# Patient Record
Sex: FEMALE | Race: BLACK OR AFRICAN AMERICAN | Employment: OTHER | URBAN - METROPOLITAN AREA
[De-identification: names, ages, dates, MRNs, and addresses within clinical notes are randomized per-mention and may not be internally consistent; named-entity substitution may affect disease eponyms.]

---

## 2017-04-18 ENCOUNTER — HOSPITAL ENCOUNTER (OUTPATIENT)
Dept: ENDOSCOPY | Age: 72
Discharge: HOME | End: 2017-04-18
Attending: INTERNAL MEDICINE | Admitting: INTERNAL MEDICINE

## 2017-04-18 VITALS
SYSTOLIC BLOOD PRESSURE: 148 MMHG | RESPIRATION RATE: 16 BRPM | BODY MASS INDEX: 40.46 KG/M2 | TEMPERATURE: 97.4 F | OXYGEN SATURATION: 97 % | WEIGHT: 289 LBS | HEART RATE: 67 BPM | DIASTOLIC BLOOD PRESSURE: 78 MMHG | HEIGHT: 71 IN

## 2017-04-18 DIAGNOSIS — R52 PAIN: ICD-10-CM

## 2017-04-18 LAB
GLUCOSE BLD-MCNC: 85 MG/DL (ref 70–99)
GLUCOSE BLD-MCNC: 97 MG/DL (ref 70–99)
PERFORMED ON: NORMAL
PERFORMED ON: NORMAL

## 2017-04-18 RX ORDER — SODIUM CHLORIDE 0.9 % (FLUSH) 0.9 %
10 SYRINGE (ML) INJECTION PRN
Status: DISCONTINUED | OUTPATIENT
Start: 2017-04-18 | End: 2017-04-19 | Stop reason: HOSPADM

## 2017-04-18 RX ORDER — SODIUM CHLORIDE 0.9 % (FLUSH) 0.9 %
10 SYRINGE (ML) INJECTION EVERY 12 HOURS SCHEDULED
Status: DISCONTINUED | OUTPATIENT
Start: 2017-04-18 | End: 2017-04-19 | Stop reason: HOSPADM

## 2017-04-18 RX ORDER — LIDOCAINE HYDROCHLORIDE 10 MG/ML
1 INJECTION, SOLUTION EPIDURAL; INFILTRATION; INTRACAUDAL; PERINEURAL
Status: ACTIVE | OUTPATIENT
Start: 2017-04-18 | End: 2017-04-18

## 2017-04-18 RX ORDER — SODIUM CHLORIDE 9 MG/ML
INJECTION, SOLUTION INTRAVENOUS CONTINUOUS
Status: DISCONTINUED | OUTPATIENT
Start: 2017-04-18 | End: 2017-04-19 | Stop reason: HOSPADM

## 2017-04-18 ASSESSMENT — PAIN - FUNCTIONAL ASSESSMENT: PAIN_FUNCTIONAL_ASSESSMENT: 0-10

## 2017-06-05 RX ORDER — SODIUM CHLORIDE 0.9 % (FLUSH) 0.9 %
10 SYRINGE (ML) INJECTION EVERY 12 HOURS SCHEDULED
Status: CANCELLED | OUTPATIENT
Start: 2017-06-05

## 2017-06-05 RX ORDER — SODIUM CHLORIDE 9 MG/ML
INJECTION, SOLUTION INTRAVENOUS CONTINUOUS
Status: CANCELLED | OUTPATIENT
Start: 2017-06-05

## 2017-06-05 RX ORDER — SODIUM CHLORIDE 0.9 % (FLUSH) 0.9 %
10 SYRINGE (ML) INJECTION PRN
Status: CANCELLED | OUTPATIENT
Start: 2017-06-05

## 2017-06-06 ENCOUNTER — HOSPITAL ENCOUNTER (OUTPATIENT)
Dept: ENDOSCOPY | Age: 72
Discharge: HOME | End: 2017-06-06
Attending: INTERNAL MEDICINE | Admitting: INTERNAL MEDICINE

## 2017-07-18 PROBLEM — Z85.3 HISTORY OF RIGHT BREAST CANCER: Status: ACTIVE | Noted: 2017-07-18

## 2017-07-18 PROBLEM — C54.1 ENDOMETRIAL ADENOCARCINOMA (HCC): Status: ACTIVE | Noted: 2017-04-14

## 2017-07-18 PROBLEM — Z92.21 HISTORY OF CHEMOTHERAPY: Status: ACTIVE | Noted: 2017-07-18

## 2017-07-18 PROBLEM — Z92.3 HISTORY OF RADIATION THERAPY: Status: ACTIVE | Noted: 2017-07-18

## 2017-07-19 ENCOUNTER — HOSPITAL ENCOUNTER (OUTPATIENT)
Dept: OTHER | Age: 72
Discharge: HOME | End: 2017-07-19
Attending: INTERNAL MEDICINE | Admitting: INTERNAL MEDICINE

## 2017-07-19 DIAGNOSIS — R10.9 ABDOMINAL PAIN, UNSPECIFIED SITE: ICD-10-CM

## 2017-10-26 ENCOUNTER — HOSPITAL ENCOUNTER (OUTPATIENT)
Dept: ENDOSCOPY | Age: 72
Discharge: HOME | End: 2017-10-26
Attending: INTERNAL MEDICINE | Admitting: INTERNAL MEDICINE

## 2017-10-26 LAB
GLUCOSE BLD-MCNC: 111 MG/DL (ref 70–99)
GLUCOSE BLD-MCNC: 84 MG/DL (ref 70–99)
PERFORMED ON: ABNORMAL
PERFORMED ON: NORMAL

## 2017-10-26 NOTE — ANESTHESIA PRE-OP
27 MG TABS Take by mouth      Omega-3 Fatty Acids (FISH OIL PO) Take 1 tablet by mouth daily      CycloSPORINE (RESTASIS MULTIDOSE OP) Apply 1 drop to eye 2 times daily       lipase-protease-amylase (CREON) 96641 UNITS delayed release capsule Take 2 capsules by mouth 3 times daily (with meals) 180 capsule 11    MULTIPLE VITAMIN PO Take 1 capsule by mouth daily       Ascorbic Acid (VITAMIN C) 500 MG tablet Take 500 mg by mouth 2 times daily       Loratadine 10 MG CAPS Take 10 mg by mouth daily       torsemide (DEMADEX) 20 MG tablet Take 20 mg by mouth daily       glimepiride (AMARYL) 2 MG tablet Take 2 mg by mouth daily.  minoxidil (LONITEN) 2.5 MG tablet Take 10 mg by mouth 2 times daily.  atorvastatin (LIPITOR) 20 MG tablet Take 20 mg by mouth daily.  metoprolol (LOPRESSOR) 50 MG tablet Take 50 mg by mouth 2 times daily.  CloNIDine HCl (CATAPRES-TTS-3 TD) Place  onto the skin once a week. THURSDAY      valsartan (DIOVAN) 160 MG tablet Take 160 mg by mouth 2 times daily.  verapamil (VERELAN PM) 240 MG CR capsule Take 240 mg by mouth nightly. No current facility-administered medications for this encounter. Allergies:     Allergies   Allergen Reactions    Furosemide Other (See Comments)     Pancreatitis    Tetracyclines & Related Other (See Comments)     'gives me a pancreatitis\"    Thiazide-Type Diuretics Other (See Comments)     Causes pancreatitis    Propofol Other (See Comments)     Causes pancreatitis    Contrast [Iodides] Hives    Toradol [Ketorolac Tromethamine] Other (See Comments)     Causes pancreatitis    Percocet [Oxycodone-Acetaminophen] Nausea Only       Problem List:    Patient Active Problem List   Diagnosis Code    Diabetes mellitus ([facility] [address] E11.9    Essential hypertension I10    Dyslipidemia E78.5    Obesity E66.9    Anemia D64.9    Breast cancer, right breast ([facility] [address] C50.911    Acute on chronic pancreatitis (HCC) K85.90, K86.1    Chronic pancreatitis ([facility] [address] K86.1    Hypokalemia E87.6    Endometrial adenocarcinoma (HCC) C54.1    Sleep apnea G47.30    Pancreatitis K85.90    Pancreatic abnormality Q45.3    Hypertension I10    Hyperlipidemia E78.5    History of right breast cancer Z85.3    History of chemotherapy Z92.21    History of radiation therapy Z92.3    Patient is Uatsdin Z78.9       Past Medical History:        Diagnosis Date    Adenomatous colon polyp 10/31/2014    Diabetes mellitus ([facility] [address]     Endometrial adenocarcinoma ([facility] [address] 04/14/2017    on D&C with Dr. Jose Gonzalez History of breast cancer in female 06/2003    Right w recurrence 2/2014    Hyperlipidemia     Hypertension     Obesity     Pancreatic abnormality     pancreatic stent placement - 2017    Pancreatitis     Sleep apnea     DOESN'T USE CPAP       Past Surgical History:        Procedure Laterality Date    APPENDECTOMY      BACK SURGERY      LIPOMA REMOVED    BREAST LUMPECTOMY Right 06/12/2003    dr. Preston Davis  10/31/2014    FU 3 yr    COSMETIC SURGERY      breast    DILATION AND CURETTAGE OF UTERUS  04/14/2017    ENDOSCOPIC ULTRASOUND (UPPER)  2/4/15    ERCP  6/5/2015    Sphincerotomy, Sphincter of Oddi Manometry, Pancreatic stent placement    ERCP  7/21/15    PANCREATIC STENT EXCHANGE    ERCP  09/01/15    stent removal    ERCP N/A 10/20/2015    Endoscopic retrograde pancreatography with pancreatic stent exchange    ERCP N/A 04/18/2017    ocedure: Endoscopic retrograde cholangiopancreatography with biopsy and pancreatic stent exchange    ERCP N/A 05/17/2017    EYE SURGERY      KNEE ARTHROSCOPY      MASTECTOMY, BILATERAL Bilateral 02/04/2014    Dr. Freitas Monday 06/16/2017    [facility]  12[facility]14[facility]    UPPER GASTROINTESTINAL ENDOSCOPY  2/4/15       Social History:    Social MD          CHIEF COMPLAINT:       Chief Complaint  Patient presents with   New Patient          HISTORY OF PRESENT ILLNESS:     HPI: This is a 73 y.o. female that presents in consultation from Dr. Morris Porras for a new diagnosis of endometrial cancer. She presented earlier this year with vaginal bleeding. Ultrasound showed a 2.13 cm endometrium and a biopsy (2/16/17) showed complex hyperplasia and atypia. A D&C was performed for more thorough assessment, revealing endometrial adenocarcinoma, grade 1. On 6/16/17, she underwent a robotic-assisted laparoscopic hysterectomy and bilateral salpingo-oophorectomy. Pathology was consistent with a stage 1a endometrioid carcinoma, grade 1.      She presents today for follow up.      She has a medical history notable for recurrent breast cancer (invasive ductal carcinoma). She has a family history of liver cancer in her father (attributed to alcoholism) and lung cancer in her mother (attributed to smoking). She has never undergone genetic testing.           Oncology History    1. Invasive ductal carcinoma at the right breast stage [id number], ER positive,  CT negative, HER-2/amalia FISH positive diagnosed 07/03 status post lumpectomy, sentinel  and axillary node dissection followed by breast radiation, four cycles of AC, Arimidex for  five years completed 11/08.     2. Recurrent 2/2014 with bilateral mastectomy     3.  Stage 1a, Grade 1 endometrioid adenocarcinoma, negative LVSI     Breast cancer, right breast ([facility] [address]    5/27/2003 Initial Diagnosis      Breast cancer, right breast [facility])        - 8/6/2003 Radiation              6/12/2003 Surgery      Lumpectomy and LND with Dr. Farrukh short       11/5/2013 Relapse      Abnormal mammogram       2/4/2014 Surgery      Bilateral simple mastectomy and immediate reconstruction using tissue expanders.        Endometrial adenocarcinoma ([facility] [address]    4/14/2017 Initial Diagnosis      Endometrial adenocarcinoma [facility])       6/16/2017 Surgery      Robotic (See Comments) 04/26/2016   Tetracyclines & related Other (See Comments) 10/21/2012   Thiazide-type diuretics Other (See Comments) 10/21/2012   Propofol Other (See Comments) 10/21/2012   Contrast (iodides) Hives 01/01/2014   Toradol (ketorolac tromethamine) Other (See Comments) 06/05/2015   Percocet (oxycodone-acetaminophen) Nausea Only 04/11/2010          MEDICATIONS:      Current Medication     Current Outpatient Prescriptions:     ferrous sulfate 27 MG TABS, Take by mouth, Disp: , Rfl:     Omega-3 Fatty Acids (FISH OIL PO), Take 1 tablet by mouth daily, Disp: , Rfl:     CycloSPORINE (RESTASIS MULTIDOSE OP), Apply 1 drop to eye 2 times daily , Disp: , Rfl:     lipase-protease-amylase (CREON) 03481 UNITS delayed release capsule, Take 2 capsules by mouth 3 times daily (with meals), Disp: 180 capsule, Rfl: 11    MULTIPLE VITAMIN PO, Take 1 capsule by mouth daily , Disp: , Rfl:     Ascorbic Acid (VITAMIN C) 500 MG tablet, Take 500 mg by mouth 2 times daily , Disp: , Rfl:     Loratadine 10 MG CAPS, Take 10 mg by mouth daily , Disp: , Rfl:     torsemide (DEMADEX) 20 MG tablet, Take 20 mg by mouth daily , Disp: , Rfl:     glimepiride (AMARYL) 2 MG tablet, Take 2 mg by mouth daily. , Disp: , Rfl:     minoxidil (LONITEN) 2.5 MG tablet, Take 10 mg by mouth 2 times daily. , Disp: , Rfl:     atorvastatin (LIPITOR) 20 MG tablet, Take 20 mg by mouth daily. , Disp: , Rfl:     metoprolol (LOPRESSOR) 50 MG tablet, Take 50 mg by mouth 2 times daily. , Disp: , Rfl:     CloNIDine HCl (CATAPRES-TTS-3 TD), Place  onto the skin once a week. THURSDAY, Disp: , Rfl:     valsartan (DIOVAN) 160 MG tablet, Take 160 mg by mouth 2 times daily. , Disp: , Rfl:     verapamil (VERELAN PM) 240 MG CR capsule, Take 240 mg by mouth nightly., Disp: , Rfl:          PHYSICAL EXAM:       /64  Pulse 68  Temp 98.6 °F (37 °C) (Oral)   Resp 14  Ht 5' 10.87\" (1.8 m)  Wt 269 lb 3.2 oz (122.1 kg)  LMP 02/04/2003  BMI 37.69 kg/m2  Physical Exam   Constitutional: She appears well-developed and well-nourished. No distress. HENT:   Head: Normocephalic. Neck: Normal range of motion. No thyromegaly present. Cardiovascular: Normal rate and regular rhythm. Exam reveals no gallop and no friction rub. No murmur heard. Pulmonary/Chest: Effort normal and breath sounds normal. No respiratory distress. She has no wheezes. She has no rales. Abdominal: Soft. She exhibits no distension. There is no tenderness. Genitourinary:   Genitourinary Comments: Deferred (patient had pelvic exam in Dr. Ace Messina office yesterday)   Musculoskeletal: Normal range of motion. Lymphadenopathy:     She has no cervical adenopathy. Neurological: She is alert. Skin: Skin is warm and dry. No rash noted. She is not diaphoretic. No erythema. Psychiatric: She has a normal mood and affect. Her behavior is normal.           LABS:     Pathology (2/16/17): EMB  Endometrium, curettage:  - Complex endometrial hyperplasia without atypia.     Pathology (4/14/17): D&C  Endometrium, curettage:  - Foci of endometrioid adenocarcinoma, FIGO grade 1, arising in a background of extensive endometrial hyperplasia with atypia      Pathology (6/16/17): TLH/BSO  Uterus, cervix, right and left ovaries, and right and left fallopian tubes; robotic hysterectomy and bilateral salpingo-oophorectomy:  - Invasive endometrioid adenocarcinoma, FIGO grade 1, involving less than 50% of the myometrium and arising in a background of complex endometrial hyperplasia with atypia (See Synoptic Report). - Margins of resection are negative for involvement.   - Cervix, right and left ovaries, and right and left fallopian tubes negative for malignancy.       WBC  Date Value Ref Range Status  06/07/2017 8.8 4.0 - 11.0 K/uL Final       Hemoglobin  Date Value Ref Range Status  06/07/2017 8.9 (L) 12.0 - 16.0 g/dL Final       Hematocrit  Date Value Ref Range Status  06/07/2017 27.6 (L) 36.0 - 48.0 NILA Taylor MD  [facility]  [phone] ([id number])          Office Visit on 7/19/2017          Revision History          Detailed Report         Note shared with patient   Progress Notes Info     Author Note Status Last Update User  MD Claudia Signed MD Claudia  Last Update Date/Time: 7/22/2017 11:04 PM  Chart Review Routing History     Routing history could not be found for this note. This is because the note has never been routed or because communication record creation was suppressed.    )        Anesthetic plan and risks discussed with patient. Plan discussed with CRNA.                 Ian Ahumada, MD   10/26/2017

## 2017-10-27 NOTE — ANESTHESIA POST-OP
Anesthesia Post-op Note    Patient: Alma Jurado  MRN: [id number]  YOB: 1945  Date of evaluation: 10/27/2017  Time:  9:35 AM     Procedure(s) Performed:     Last Vitals: LMP 02/04/2003     Fernando Phase I:      Fernando Phase II:      Anesthesia Post Evaluation    Final anesthesia type: TIVA and MAC  Patient location during evaluation: PACU  Level of consciousness: awake and alert  Complications: no  Respiratory status: acceptable  Hydration status: euvolemic        JUAN PENA MD  9:35 AM

## 2018-01-16 PROBLEM — Z85.42 HISTORY OF ENDOMETRIAL CANCER: Status: ACTIVE | Noted: 2018-01-16

## 2019-06-25 ENCOUNTER — HOSPITAL ENCOUNTER (OUTPATIENT)
Dept: ULTRASOUND IMAGING | Age: 74
Discharge: HOME | End: 2019-06-25
Payer: MEDICARE

## 2019-06-25 DIAGNOSIS — N18.9 CHRONIC KIDNEY DISEASE, UNSPECIFIED CKD STAGE: ICD-10-CM

## 2019-06-25 PROCEDURE — 76770 US EXAM ABDO BACK WALL COMP: CPT

## 2019-10-07 PROBLEM — K62.1 RECTAL POLYP: Status: ACTIVE | Noted: 2017-11-16

## 2019-10-07 PROBLEM — Z86.010 HISTORY OF COLONIC POLYPS: Status: ACTIVE | Noted: 2017-11-16

## 2019-10-07 PROBLEM — E03.8 SECONDARY HYPOTHYROIDISM: Status: ACTIVE | Noted: 2018-09-11

## 2019-10-07 PROBLEM — N89.8 VAGINAL DISCHARGE: Status: ACTIVE | Noted: 2019-10-07

## 2019-10-07 PROBLEM — N28.9 ACUTE RENAL IMPAIRMENT: Status: ACTIVE | Noted: 2019-03-12

## 2019-10-07 PROBLEM — C50.919 PRIMARY MALIGNANT NEOPLASM OF FEMALE BREAST (HCC): Status: ACTIVE | Noted: 2019-10-07

## 2019-10-07 PROBLEM — Z86.0100 HISTORY OF COLONIC POLYPS: Status: ACTIVE | Noted: 2017-11-16

## 2019-11-22 ENCOUNTER — HOSPITAL ENCOUNTER (OUTPATIENT)
Dept: ULTRASOUND IMAGING | Age: 74
Discharge: HOME | End: 2019-11-22
Payer: MEDICARE

## 2019-11-22 ENCOUNTER — HOSPITAL ENCOUNTER (OUTPATIENT)
Age: 74
Discharge: HOME | End: 2019-11-22
Payer: MEDICARE

## 2019-11-22 DIAGNOSIS — N17.9 AKI (ACUTE KIDNEY INJURY) (HCC): ICD-10-CM

## 2019-11-22 DIAGNOSIS — I10 ESSENTIAL HYPERTENSION: Chronic | ICD-10-CM

## 2019-11-22 PROCEDURE — 76770 US EXAM ABDO BACK WALL COMP: CPT

## 2019-11-25 ENCOUNTER — HOSPITAL ENCOUNTER (OUTPATIENT)
Age: 74
Discharge: HOME | End: 2019-11-25
Payer: MEDICARE

## 2019-11-25 DIAGNOSIS — I10 ESSENTIAL HYPERTENSION: Chronic | ICD-10-CM

## 2019-11-25 DIAGNOSIS — N17.9 AKI (ACUTE KIDNEY INJURY) (HCC): ICD-10-CM

## 2019-11-25 LAB
24HR URINE VOLUME (ML): 1700 ML
ALBUMIN SERPL-MCNC: 4.2 G/DL (ref 3.4–5)
ANION GAP SERPL CALCULATED.3IONS-SCNC: 11 MMOL/L (ref 3–16)
BILIRUBIN URINE: NEGATIVE
BLOOD, URINE: NEGATIVE
BUN BLDV-MCNC: 31 MG/DL (ref 7–20)
C3 COMPLEMENT: 143.8 MG/DL (ref 90–180)
C4 COMPLEMENT: 48.4 MG/DL (ref 10–40)
CALCIUM SERPL-MCNC: 9.5 MG/DL (ref 8.3–10.6)
CHLORIDE BLD-SCNC: 99 MMOL/L (ref 99–110)
CLARITY: ABNORMAL
CO2: 28 MMOL/L (ref 21–32)
COLOR: YELLOW
CREAT SERPL-MCNC: 1.6 MG/DL (ref 0.6–1.2)
CREAT SERPL-MCNC: 1.6 MG/DL (ref 0.6–1.2)
CREATININE 24 HOUR URINE: 1.5 G/24HR (ref 0.6–1.5)
CREATININE CLEARANCE: 46 ML/MIN (ref 88–128)
CREATININE URINE: 172.1 MG/DL (ref 28–259)
EPITHELIAL CELLS, UA: 10 /HPF (ref 0–5)
FERRITIN: 214.6 NG/ML (ref 15–150)
GFR AFRICAN AMERICAN: 38
GFR NON-AFRICAN AMERICAN: 32
GLUCOSE BLD-MCNC: 94 MG/DL (ref 70–99)
GLUCOSE URINE: NEGATIVE MG/DL
HCT VFR BLD CALC: 30.7 % (ref 36–48)
HEMOGLOBIN: 10.1 G/DL (ref 12–16)
HYALINE CASTS: 10 /LPF (ref 0–8)
IRON SATURATION: 31 % (ref 15–50)
IRON: 66 UG/DL (ref 37–145)
KETONES, URINE: NEGATIVE MG/DL
LEUKOCYTE ESTERASE, URINE: NEGATIVE
Lab: 24 HR
MCH RBC QN AUTO: 27.8 PG (ref 26–34)
MCHC RBC AUTO-ENTMCNC: 32.9 G/DL (ref 31–36)
MCV RBC AUTO: 84.7 FL (ref 80–100)
MICROSCOPIC EXAMINATION: YES
NITRITE, URINE: NEGATIVE
PDW BLD-RTO: 15.1 % (ref 12.4–15.4)
PH UA: 6 (ref 5–8)
PHOSPHORUS: 2.9 MG/DL (ref 2.5–4.9)
PLATELET # BLD: 368 K/UL (ref 135–450)
PMV BLD AUTO: 7.9 FL (ref 5–10.5)
POTASSIUM SERPL-SCNC: 4.3 MMOL/L (ref 3.5–5.1)
PROTEIN 24 HOUR URINE: 0.09 G/24HR
PROTEIN PROTEIN: 10 MG/DL
PROTEIN UA: NEGATIVE MG/DL
RBC # BLD: 3.62 M/UL (ref 4–5.2)
RBC UA: ABNORMAL /HPF (ref 0–2)
SODIUM BLD-SCNC: 138 MMOL/L (ref 136–145)
SPECIFIC GRAVITY UA: 1.01 (ref 1–1.03)
TOTAL IRON BINDING CAPACITY: 210 UG/DL (ref 260–445)
URINE TYPE: ABNORMAL
UROBILINOGEN, URINE: 0.2 E.U./DL
WBC # BLD: 6.8 K/UL (ref 4–11)
WBC UA: 2 /HPF (ref 0–5)
YEAST: PRESENT /HPF

## 2019-11-25 PROCEDURE — 83550 IRON BINDING TEST: CPT

## 2019-11-25 PROCEDURE — 85027 COMPLETE CBC AUTOMATED: CPT

## 2019-11-25 PROCEDURE — 80069 RENAL FUNCTION PANEL: CPT

## 2019-11-25 PROCEDURE — 84156 ASSAY OF PROTEIN URINE: CPT

## 2019-11-25 PROCEDURE — 82575 CREATININE CLEARANCE TEST: CPT

## 2019-11-25 PROCEDURE — 84244 ASSAY OF RENIN: CPT

## 2019-11-25 PROCEDURE — 84166 PROTEIN E-PHORESIS/URINE/CSF: CPT

## 2019-11-25 PROCEDURE — 84155 ASSAY OF PROTEIN SERUM: CPT

## 2019-11-25 PROCEDURE — 86160 COMPLEMENT ANTIGEN: CPT

## 2019-11-25 PROCEDURE — 82088 ASSAY OF ALDOSTERONE: CPT

## 2019-11-25 PROCEDURE — 82728 ASSAY OF FERRITIN: CPT

## 2019-11-25 PROCEDURE — 84165 PROTEIN E-PHORESIS SERUM: CPT

## 2019-11-25 PROCEDURE — 86038 ANTINUCLEAR ANTIBODIES: CPT

## 2019-11-25 PROCEDURE — 82570 ASSAY OF URINE CREATININE: CPT

## 2019-11-25 PROCEDURE — 83835 ASSAY OF METANEPHRINES: CPT

## 2019-11-25 PROCEDURE — 83540 ASSAY OF IRON: CPT

## 2019-11-25 PROCEDURE — 36415 COLL VENOUS BLD VENIPUNCTURE: CPT

## 2019-11-25 PROCEDURE — 84585 ASSAY OF URINE VMA: CPT

## 2019-11-25 PROCEDURE — 81001 URINALYSIS AUTO W/SCOPE: CPT

## 2019-11-26 LAB — ANTI-NUCLEAR ANTIBODY (ANA): NEGATIVE

## 2019-11-27 LAB
ALBUMIN SERPL-MCNC: 3.4 G/DL (ref 3.1–4.9)
ALDOSTERONE: 13 NG/DL
ALPHA-1-GLOBULIN: 0.2 G/DL (ref 0.2–0.4)
ALPHA-2-GLOBULIN: 1.1 G/DL (ref 0.4–1.1)
BETA GLOBULIN: 1 G/DL (ref 0.9–1.6)
GAMMA GLOBULIN: 1.6 G/DL (ref 0.6–1.8)
SPE/IFE INTERPRETATION: NORMAL
TOTAL PROTEIN: 7.3 G/DL (ref 6.4–8.2)
URINE ELECTROPHORESIS INTERP: NORMAL

## 2019-11-29 LAB
CREATININE 24 HOUR URINE: 1326 MG/D (ref 500–1400)
CREATININE URINE: 78 MG/DL
HOURS COLLECTED: 24 HR
METANEPH/PLASMA INTERP: NORMAL
METANEPHRINE FREE PLASMA: 0.29 NMOL/L (ref 0–0.49)
METANEPHRINE INTREP URINE: NORMAL
METANEPHRINE UG/G CRE: 74 UG/G CRT (ref 0–300)
METANEPHRINE, UR-PER VOL: 58 UG/L
METANEPHRINES URINE: 99 UG/D (ref 36–229)
NORMETANEPHRINE 24 HOUR URINE: 408 UG/D (ref 95–650)
NORMETANEPHRINE FREE PLASMA: 0.74 NMOL/L (ref 0–0.89)
NORMETANEPHRINE, (G/CRT): 308 UG/G CRT (ref 0–400)
NORMETANEPHRINES, NMOL/L: 240 UG/L
URINE TOTAL VOLUME: 1700 ML
VMA INTERPRETATION: NORMAL
VMA URINE MG/ML: 2.6 MG/L
VMA, UR/G CRT: 3 MG/GCR (ref 0–6)
VMA-MG/D: 4.4 MG/D (ref 0–7)

## 2019-11-30 LAB — RENIN ACTIVITY: 21.1 NG/ML/HR

## 2020-04-24 ENCOUNTER — HOSPITAL ENCOUNTER (OUTPATIENT)
Age: 75
Discharge: HOME | End: 2020-04-24
Payer: MEDICARE

## 2020-04-24 LAB
ALBUMIN SERPL-MCNC: 3.9 G/DL (ref 3.4–5)
ANION GAP SERPL CALCULATED.3IONS-SCNC: 12 MMOL/L (ref 3–16)
BUN BLDV-MCNC: 18 MG/DL (ref 7–20)
CALCIUM SERPL-MCNC: 9.4 MG/DL (ref 8.3–10.6)
CHLORIDE BLD-SCNC: 101 MMOL/L (ref 99–110)
CO2: 28 MMOL/L (ref 21–32)
CREAT SERPL-MCNC: 1.1 MG/DL (ref 0.6–1.2)
CREATININE URINE: 79.4 MG/DL (ref 28–259)
GFR AFRICAN AMERICAN: 59
GFR NON-AFRICAN AMERICAN: 49
GLUCOSE BLD-MCNC: 98 MG/DL (ref 70–99)
HCT VFR BLD CALC: 33.4 % (ref 36–48)
HEMOGLOBIN: 10.8 G/DL (ref 12–16)
MCH RBC QN AUTO: 27.4 PG (ref 26–34)
MCHC RBC AUTO-ENTMCNC: 32.3 G/DL (ref 31–36)
MCV RBC AUTO: 84.8 FL (ref 80–100)
PDW BLD-RTO: 14.6 % (ref 12.4–15.4)
PHOSPHORUS: 3.1 MG/DL (ref 2.5–4.9)
PLATELET # BLD: 352 K/UL (ref 135–450)
PMV BLD AUTO: 7.7 FL (ref 5–10.5)
POTASSIUM SERPL-SCNC: 4.4 MMOL/L (ref 3.5–5.1)
PROTEIN PROTEIN: <4 MG/DL
RBC # BLD: 3.94 M/UL (ref 4–5.2)
SODIUM BLD-SCNC: 141 MMOL/L (ref 136–145)
WBC # BLD: 7.7 K/UL (ref 4–11)

## 2020-04-24 PROCEDURE — 85027 COMPLETE CBC AUTOMATED: CPT

## 2020-04-24 PROCEDURE — 82570 ASSAY OF URINE CREATININE: CPT

## 2020-04-24 PROCEDURE — 84156 ASSAY OF PROTEIN URINE: CPT

## 2020-04-24 PROCEDURE — 80069 RENAL FUNCTION PANEL: CPT

## 2020-09-07 ENCOUNTER — HOSPITAL ENCOUNTER (OUTPATIENT)
Age: 75
Discharge: HOME | End: 2020-09-07
Payer: MEDICARE

## 2020-09-07 LAB
ALBUMIN SERPL-MCNC: 3.9 G/DL (ref 3.4–5)
ANION GAP SERPL CALCULATED.3IONS-SCNC: 11 MMOL/L (ref 3–16)
BUN BLDV-MCNC: 32 MG/DL (ref 7–20)
CALCIUM SERPL-MCNC: 9.4 MG/DL (ref 8.3–10.6)
CHLORIDE BLD-SCNC: 104 MMOL/L (ref 99–110)
CO2: 22 MMOL/L (ref 21–32)
CREAT SERPL-MCNC: 1.3 MG/DL (ref 0.6–1.2)
GFR AFRICAN AMERICAN: 49
GFR NON-AFRICAN AMERICAN: 40
GLUCOSE BLD-MCNC: 90 MG/DL (ref 70–99)
HCT VFR BLD CALC: 35.8 % (ref 36–48)
HEMOGLOBIN: 11 G/DL (ref 12–16)
MCH RBC QN AUTO: 27.9 PG (ref 26–34)
MCHC RBC AUTO-ENTMCNC: 30.7 G/DL (ref 31–36)
MCV RBC AUTO: 90.6 FL (ref 80–100)
PDW BLD-RTO: 15.9 % (ref 12.4–15.4)
PHOSPHORUS: 3.4 MG/DL (ref 2.5–4.9)
PLATELET # BLD: 266 K/UL (ref 135–450)
PMV BLD AUTO: 8.2 FL (ref 5–10.5)
POTASSIUM SERPL-SCNC: 4.6 MMOL/L (ref 3.5–5.1)
RBC # BLD: 3.95 M/UL (ref 4–5.2)
SODIUM BLD-SCNC: 137 MMOL/L (ref 136–145)
WBC # BLD: 6.4 K/UL (ref 4–11)

## 2020-09-07 PROCEDURE — 36415 COLL VENOUS BLD VENIPUNCTURE: CPT

## 2020-09-07 PROCEDURE — 85027 COMPLETE CBC AUTOMATED: CPT

## 2020-09-07 PROCEDURE — 80069 RENAL FUNCTION PANEL: CPT

## 2020-09-07 PROCEDURE — 82570 ASSAY OF URINE CREATININE: CPT

## 2020-09-07 PROCEDURE — 84156 ASSAY OF PROTEIN URINE: CPT

## 2020-09-08 LAB
CREATININE URINE: 60.3 MG/DL (ref 28–259)
PROTEIN PROTEIN: <4 MG/DL
PROTEIN/CREAT RATIO: NORMAL MG/DL

## 2020-11-30 ENCOUNTER — OFFICE VISIT (OUTPATIENT)
Dept: PRIMARY CARE CLINIC | Age: 75
End: 2020-11-30
Payer: MEDICARE

## 2020-11-30 PROCEDURE — G8417 CALC BMI ABV UP PARAM F/U: HCPCS | Performed by: NURSE PRACTITIONER

## 2020-11-30 PROCEDURE — 99211 OFF/OP EST MAY X REQ PHY/QHP: CPT | Performed by: NURSE PRACTITIONER

## 2020-11-30 PROCEDURE — G8428 CUR MEDS NOT DOCUMENT: HCPCS | Performed by: NURSE PRACTITIONER

## 2020-11-30 NOTE — PATIENT INSTRUCTIONS

## 2020-11-30 NOTE — PROGRESS NOTES
Oriana Morrow received a viral test for COVID-19. They were educated on isolation and quarantine as appropriate. For any symptoms, they were directed to seek care from their PCP, given contact information to establish with a doctor, directed to an urgent care or the emergency room.

## 2020-12-01 LAB — SARS-COV-2: NOT DETECTED

## 2020-12-01 RX ORDER — LEVOTHYROXINE SODIUM 175 UG/1
150 TABLET ORAL DAILY
COMMUNITY

## 2020-12-01 NOTE — PROGRESS NOTES
Name_______________________________________Printed:____________________  Date and time of surgery__12/4/2020  0830______________________Arrival Time:____0700  FEC____________   1. The instructions given regarding when and if a patient needs to stop oral intake prior to surgery varies. Follow the specific instructions you were given                  ___Nothing to eat or to drink after Midnight the night before. _XXX___Endoscopy patient follow your DRS instructions-generally you will be doing a part of the prep after Midnight                   ____Carbo loading or ERAS instructions will be given to select patients-if you have been given those instructions -please do the following                           The evening before your surgery after dinner before midnight drink 40 ounces of gatorade. If you are diabetic use sugar free. The morning of surgery drink 40 ounces of water. This needs to be finished 3 hours prior to your surgery start time. 2. Take the following pills with a small sip of water on the morning of surgery_____clonidine, minoxidil, metoprolol, levothyroxine ______________________________________________               3. Aspirin, Ibuprofen, Advil, Naproxen, Vitamin E and other Anti-inflammatory products and supplements should be stopped for 5 -7days before surgery or as directed by your physician. 4. Check with your Doctor regarding stopping Plavix, Coumadin,Eliquis, Lovenox,Effient,Pradaxa,Xarelto, Fragmin or other blood thinners and follow their instructions. 5. Do not smoke, and do not drink any alcoholic beverages 24 hours prior to surgery. This includes NA Beer. Refrain from the usage of any recreational drugs. 6. You may brush your teeth and gargle the morning of surgery. DO NOT SWALLOW WATER   7. You MUST make arrangements for a responsible adult to stay on site while you are here and take you home after your surgery.  You will not be allowed to leave alone or drive yourself home. It is strongly suggested someone stay with you the first 24 hrs. Your surgery will be cancelled if you do not have a ride home. 8. A parent/legal guardian must accompany a child scheduled for surgery and plan to stay at the hospital until the child is discharged. Please do not bring other children with you. 9. Please wear simple, loose fitting clothing to the hospital.  Eve Sy not bring valuables (money, credit cards, checkbooks, etc.) Do not wear any makeup (including no eye makeup) or nail polish on your fingers or toes. 10. DO NOT wear any jewelry or piercings on day of surgery. All body piercing jewelry must be removed. 11. If you have ___dentures, they will be removed before going to the OR; we will provide you a container. If you wear ___contact lenses or ___glasses, they will be removed; please bring a case for them. 12. Please see your family doctor/pediatrician for a history & physical and/or concerning medications. Bring any test results/reports from your physician's office. PCP__________________Phone___________H&P Appt. Date________             13 If you  have a Living Will and Durable Power of  for Healthcare, please bring in a copy. 15. Notify your Surgeon if you develop any illness between now and surgery  time, cough, cold, fever, sore throat, nausea, vomiting, etc.  Please notify your surgeon if you experience dizziness, shortness of breath or blurred vision between now & the time of your surgery             15. DO NOT shave your operative site 96 hours prior to surgery. For face & neck surgery, men may use an electric razor 48 hours prior to surgery. 16. Shower the night before or morning of surgery using an antibacterial soap or as you have been instructed. 17. To provide excellent care visitors will be limited to one in the room at any given time.              18.  Please bring picture ID and insurance card. 19.  Visit our web site for additional information:  [email or web address]              20.During flu season no children under the age of 15 are permitted in the hospital for the safety of all patients. 21. If you take a long acting insulin in the evening only  take half of your usual  dose the night  before your procedure              22. If you use a c-pap please bring DOS if staying overnight,             23.For your convenience [facility] has a pharmacy on site to fill your prescriptions. 24. If you use oxygen and have a portable tank please bring it  with you the DOS             25. Bring a complete list of all your medications with name and dose include any supplements. 26. Other__________________________________________   *Please call pre admission testing if you any further questions   Madison Bolivar   [address]    [facility] [address]  [phone]   [address] [place] [address]       All above information reviewed with patient in person or by phone. Patient verbalizes understanding. All questions and concerns addressed. Patient/Rep____________________                                                                                                                                    There is a one visitor policy at [facility] for all surgeries and endoscopies. Whether the visitor can stay or will be asked to wait in the car will depend on the current policy and if social distancing can be maintained. The policy is subject to change at any time. Please make sure the visitor has a cell phone that is on,charged and able to accept calls, as this may be the way that the staff communicates with them. Pain management is NO VISITOR policyThe patients ride is expected to remain in the car with a cell phone for communication. If the ride is leaving the hospital grounds please make sure they are back in time for pickup. Have the patient inform the staff on arrival what their rides plans are while the patient is in the facility. At the MAIN there is one visitor allowed. Please note that the visitor policy is subject to change.

## 2020-12-04 ENCOUNTER — ANESTHESIA EVENT (OUTPATIENT)
Dept: ENDOSCOPY | Age: 75
End: 2020-12-04
Payer: MEDICARE

## 2020-12-04 ENCOUNTER — HOSPITAL ENCOUNTER (OUTPATIENT)
Age: 75
Setting detail: OUTPATIENT SURGERY
Discharge: HOME | End: 2020-12-04
Attending: INTERNAL MEDICINE | Admitting: INTERNAL MEDICINE
Payer: MEDICARE

## 2020-12-04 ENCOUNTER — ANESTHESIA (OUTPATIENT)
Dept: ENDOSCOPY | Age: 75
End: 2020-12-04
Payer: MEDICARE

## 2020-12-04 VITALS
DIASTOLIC BLOOD PRESSURE: 65 MMHG | SYSTOLIC BLOOD PRESSURE: 134 MMHG | RESPIRATION RATE: 10 BRPM | OXYGEN SATURATION: 100 %

## 2020-12-04 VITALS
HEART RATE: 57 BPM | BODY MASS INDEX: 39.37 KG/M2 | OXYGEN SATURATION: 98 % | TEMPERATURE: 98.3 F | RESPIRATION RATE: 16 BRPM | SYSTOLIC BLOOD PRESSURE: 131 MMHG | HEIGHT: 70 IN | DIASTOLIC BLOOD PRESSURE: 64 MMHG | WEIGHT: 275 LBS

## 2020-12-04 LAB
GLUCOSE BLD-MCNC: 120 MG/DL (ref 70–99)
GLUCOSE BLD-MCNC: 98 MG/DL (ref 70–99)
PERFORMED ON: ABNORMAL
PERFORMED ON: NORMAL

## 2020-12-04 PROCEDURE — 6360000002 HC RX W HCPCS: Performed by: NURSE ANESTHETIST, CERTIFIED REGISTERED

## 2020-12-04 PROCEDURE — 3700000001 HC ADD 15 MINUTES (ANESTHESIA): Performed by: INTERNAL MEDICINE

## 2020-12-04 PROCEDURE — 7100000011 HC PHASE II RECOVERY - ADDTL 15 MIN: Performed by: INTERNAL MEDICINE

## 2020-12-04 PROCEDURE — 88305 TISSUE EXAM BY PATHOLOGIST: CPT

## 2020-12-04 PROCEDURE — 3609010600 HC COLONOSCOPY POLYPECTOMY SNARE/COLD BIOPSY: Performed by: INTERNAL MEDICINE

## 2020-12-04 PROCEDURE — 3700000000 HC ANESTHESIA ATTENDED CARE: Performed by: INTERNAL MEDICINE

## 2020-12-04 PROCEDURE — 2709999900 HC NON-CHARGEABLE SUPPLY: Performed by: INTERNAL MEDICINE

## 2020-12-04 PROCEDURE — 7100000010 HC PHASE II RECOVERY - FIRST 15 MIN: Performed by: INTERNAL MEDICINE

## 2020-12-04 PROCEDURE — 2500000003 HC RX 250 WO HCPCS: Performed by: NURSE ANESTHETIST, CERTIFIED REGISTERED

## 2020-12-04 PROCEDURE — 2580000003 HC RX 258: Performed by: ANESTHESIOLOGY

## 2020-12-04 RX ORDER — ETOMIDATE 2 MG/ML
INJECTION INTRAVENOUS PRN
Status: DISCONTINUED | OUTPATIENT
Start: 2020-12-04 | End: 2020-12-04 | Stop reason: SDUPTHER

## 2020-12-04 RX ORDER — FENTANYL CITRATE 50 UG/ML
INJECTION, SOLUTION INTRAMUSCULAR; INTRAVENOUS PRN
Status: DISCONTINUED | OUTPATIENT
Start: 2020-12-04 | End: 2020-12-04 | Stop reason: SDUPTHER

## 2020-12-04 RX ORDER — LIDOCAINE HYDROCHLORIDE 20 MG/ML
INJECTION, SOLUTION INFILTRATION; PERINEURAL PRN
Status: DISCONTINUED | OUTPATIENT
Start: 2020-12-04 | End: 2020-12-04 | Stop reason: SDUPTHER

## 2020-12-04 RX ORDER — KETAMINE HYDROCHLORIDE 10 MG/ML
INJECTION, SOLUTION INTRAMUSCULAR; INTRAVENOUS PRN
Status: DISCONTINUED | OUTPATIENT
Start: 2020-12-04 | End: 2020-12-04 | Stop reason: SDUPTHER

## 2020-12-04 RX ORDER — SODIUM CHLORIDE 9 MG/ML
INJECTION, SOLUTION INTRAVENOUS CONTINUOUS
Status: DISCONTINUED | OUTPATIENT
Start: 2020-12-04 | End: 2020-12-04 | Stop reason: HOSPADM

## 2020-12-04 RX ORDER — MIDAZOLAM HYDROCHLORIDE 1 MG/ML
INJECTION INTRAMUSCULAR; INTRAVENOUS PRN
Status: DISCONTINUED | OUTPATIENT
Start: 2020-12-04 | End: 2020-12-04 | Stop reason: SDUPTHER

## 2020-12-04 RX ADMIN — LIDOCAINE HYDROCHLORIDE 50 MG: 20 INJECTION, SOLUTION INFILTRATION; PERINEURAL at 09:28

## 2020-12-04 RX ADMIN — SODIUM CHLORIDE: 9 INJECTION, SOLUTION INTRAVENOUS at 08:01

## 2020-12-04 RX ADMIN — ETOMIDATE 2 MG: 20 INJECTION, SOLUTION INTRAVENOUS at 09:38

## 2020-12-04 RX ADMIN — FENTANYL CITRATE 25 MCG: 50 INJECTION INTRAMUSCULAR; INTRAVENOUS at 09:39

## 2020-12-04 RX ADMIN — MIDAZOLAM 1 MG: 1 INJECTION INTRAMUSCULAR; INTRAVENOUS at 09:26

## 2020-12-04 RX ADMIN — FENTANYL CITRATE 25 MCG: 50 INJECTION INTRAMUSCULAR; INTRAVENOUS at 09:31

## 2020-12-04 RX ADMIN — FENTANYL CITRATE 25 MCG: 50 INJECTION INTRAMUSCULAR; INTRAVENOUS at 09:38

## 2020-12-04 RX ADMIN — FENTANYL CITRATE 25 MCG: 50 INJECTION INTRAMUSCULAR; INTRAVENOUS at 09:41

## 2020-12-04 RX ADMIN — KETAMINE HYDROCHLORIDE 10 MG: 10 INJECTION, SOLUTION INTRAMUSCULAR; INTRAVENOUS at 09:28

## 2020-12-04 RX ADMIN — ETOMIDATE 6 MG: 20 INJECTION, SOLUTION INTRAVENOUS at 09:28

## 2020-12-04 RX ADMIN — FENTANYL CITRATE 50 MCG: 50 INJECTION INTRAMUSCULAR; INTRAVENOUS at 09:26

## 2020-12-04 ASSESSMENT — ENCOUNTER SYMPTOMS: SHORTNESS OF BREATH: 0

## 2020-12-04 ASSESSMENT — PAIN - FUNCTIONAL ASSESSMENT: PAIN_FUNCTIONAL_ASSESSMENT: 0-10

## 2020-12-04 NOTE — PROGRESS NOTES
Received from Endo Procedure Room to Phase 2 Recovery. Alert. Respirations easy. VSS. Denies any discomfort.

## 2020-12-04 NOTE — H&P
[facility] [place]    Pre-operative History and Physical    Patient: Tra Dear  : 1945  CSN:     History Obtained From:  patient and/or guardian. HISTORY OF PRESENT ILLNESS:    The patient is a 70 y.o. female with history of recurrent pancreatitis and multiple colon polyps in 2017 including one large rectal adenoma requiring transanal surgical excision here for high risk screening colonoscopy.       Past Medical History:    Past Medical History:   Diagnosis Date    Adenomatous colon polyp 10/31/2014    Anemia     Breast cancer ([facility] [address]     right    Chronic kidney disease     Diabetes mellitus ([facility] [address]     Endometrial adenocarcinoma ([facility] [address] 2017    on D&C with Dr. Vicente Oscar History of breast cancer in female 2003    Right w recurrence 2014    Hyperlipidemia     Hypertension     OA (osteoarthritis)     Obesity     Pancreatic abnormality     pancreatic stent placement -     Pancreatitis     Sleep apnea     DOESN'T USE CPAP    Thyroid disease      Past Surgical History:    Past Surgical History:   Procedure Laterality Date    APPENDECTOMY      BACK SURGERY      LIPOMA REMOVED    BREAST ENHANCEMENT SURGERY Bilateral     BREAST LUMPECTOMY Right 2003    dr. Kamini Naranjo Bilateral 10/2020 2020    CHOLECYSTECTOMY  1984    COLONOSCOPY  10/31/2014    FU 3 yr    DILATION AND CURETTAGE OF UTERUS  2017    ENDOSCOPIC ULTRASOUND (UPPER)  2/4/15    ERCP  2015    Sphincerotomy, Sphincter of Oddi Manometry, Pancreatic stent placement    ERCP  7/21/15    PANCREATIC STENT EXCHANGE    ERCP  09/01/15    stent removal    ERCP N/A 10/20/2015    Endoscopic retrograde pancreatography with pancreatic stent exchange    ERCP N/A 2017    ocedure: Endoscopic retrograde cholangiopancreatography with biopsy and pancreatic stent exchange    ERCP N/A 2017    EYE SURGERY      HYSTERECTOMY, TOTAL ABDOMINAL      KNEE ARTHROSCOPY Right     LIPOMA RESECTION      removed from back    MASTECTOMY, BILATERAL Bilateral 02/04/2014    Dr. Sabrina Bang; no lymph node removal    OTHER SURGICAL HISTORY      UPPER ENDOSCOPIC ULTRASOUND    NELSON AND BSO  06/16/2017    Robotic hyst, BSO with Dr. Sharath Roldan  12/14/12    UPPER GASTROINTESTINAL ENDOSCOPY  2/4/15     Medications Prior to Admission:   No current facility-administered medications on file prior to encounter. Current Outpatient Medications on File Prior to Encounter   Medication Sig Dispense Refill    levothyroxine (SYNTHROID) 150 MCG tablet Take 150 mcg by mouth Daily      Carboxymethylcellulose Sodium (EYE DROPS OP) Apply to eye as needed      minoxidil (LONITEN) 10 MG tablet Take 10 mg by mouth 2 times daily       atorvastatin (LIPITOR) 20 MG tablet Take 20 mg by mouth daily.  metoprolol (LOPRESSOR) 50 MG tablet Take 50 mg by mouth 2 times daily.  valsartan (DIOVAN) 160 MG tablet Take 160 mg by mouth daily       verapamil (CALAN) 120 MG tablet Take 120 mg by mouth nightly       Cholecalciferol (VITAMIN D) 50 MCG (2000 UT) CAPS capsule Take 2,000 Units by mouth daily      lipase-protease-amylase (CREON) 77147 UNITS delayed release capsule Take 2 capsules by mouth 3 times daily (with meals) 180 capsule 11    torsemide (DEMADEX) 20 MG tablet Take 20 mg by mouth daily       glimepiride (AMARYL) 2 MG tablet Take 2 mg by mouth daily.  CloNIDine HCl (CATAPRES-TTS-3 TD) Place  onto the skin once a week. THURSDAY          Allergies:  Furosemide; Metolazone; Tetracyclines & related; Thiazide-type diuretics; Propofol; Barium iodide; Contrast [iodides]; Diprivan [propofol];  Toradol [ketorolac tromethamine]; and Percocet [oxycodone-acetaminophen]      Social History:   Social History     Tobacco Use    Smoking status: Never Smoker    Smokeless tobacco: Never Used   Substance Use Topics    Alcohol use: No     Family History:   Family History

## 2020-12-04 NOTE — ANESTHESIA POSTPROCEDURE EVALUATION
Department of Anesthesiology  Postprocedure Note    Patient: Issa Godoy  MRN: [id number]  YOB: 1945  Date of evaluation: 12/4/2020  Time:  10:26 AM     Procedure Summary     Date:  12/04/20 Room / Location:  [address] [place] [address]    Anesthesia Start:  2001 Anesthesia Stop:  1967    Procedure:  COLONOSCOPY POLYPECTOMY SNARE/COLD BIOPSY (N/A ) Diagnosis:  (HISTORY OF COLON POLYPS Z86.010)    Surgeon:  Shu Angeles MD Responsible Provider:  Fanta Mcdonald MD    Anesthesia Type:  MAC ASA Status:  3          Anesthesia Type: MAC    Fernando Phase I: Fernando Score: 10    Fernando Phase II: Fernando Score: 10    Last vitals: Reviewed and per EMR flowsheets.        Anesthesia Post Evaluation    Patient location during evaluation: PACU  Level of consciousness: awake  Airway patency: patent  Complications: no  Cardiovascular status: hemodynamically stable  Respiratory status: acceptable

## 2020-12-04 NOTE — ANESTHESIA PRE PROCEDURE
Department of Anesthesiology  Preprocedure Note       Name:  Issa Godoy   Age:  70 y.o.  :  1945                                          MRN:  [id number]         Date:  2020      Surgeon: Brian Perkins):  Tod Abdalla MD    Procedure: Procedure(s):  COLONOSCOPY DIAGNOSTIC    Medications prior to admission:   Prior to Admission medications    Medication Sig Start Date End Date Taking? Authorizing Provider   levothyroxine (SYNTHROID) 150 MCG tablet Take 150 mcg by mouth Daily   Yes Historical Provider, MD   Cholecalciferol (VITAMIN D) 50 MCG ( UT) CAPS capsule Take 2,000 Units by mouth daily    Historical Provider, MD   Carboxymethylcellulose Sodium (EYE DROPS OP) Apply to eye as needed    Historical Provider, MD   lipase-protease-amylase (CREON) 15015 UNITS delayed release capsule Take 2 capsules by mouth 3 times daily (with meals) 17   Tod Abdalla MD   torsemide (DEMADEX) 20 MG tablet Take 20 mg by mouth daily     Historical Provider, MD   glimepiride (AMARYL) 2 MG tablet Take 2 mg by mouth daily. 4/11/10   Historical Provider, MD   minoxidil (LONITEN) 10 MG tablet Take 10 mg by mouth 2 times daily  4/11/10   Historical Provider, MD   atorvastatin (LIPITOR) 20 MG tablet Take 20 mg by mouth daily. Historical Provider, MD   metoprolol (LOPRESSOR) 50 MG tablet Take 50 mg by mouth 2 times daily. Historical Provider, MD   CloNIDine HCl (CATAPRES-TTS-3 TD) Place  onto the skin once a week. THURSDAY 4/11/10   Historical Provider, MD   valsartan (DIOVAN) 160 MG tablet Take 160 mg by mouth daily  4/11/10   Historical Provider, MD   verapamil (CALAN) 120 MG tablet Take 120 mg by mouth nightly     Historical Provider, MD       Current medications:    Current Facility-Administered Medications   Medication Dose Route Frequency Provider Last Rate Last Dose    0.9 % sodium chloride infusion   Intravenous Continuous Fanta Mcdonald MD           Allergies:     Allergies   Allergen Reactions    Furosemide Other (See Comments)     Pancreatitis    Metolazone Other (See Comments)     Causes pancreatitis    Tetracyclines & Related Other (See Comments)     'gives me a pancreatitis\"    Thiazide-Type Diuretics Other (See Comments)     Causes pancreatitis    Propofol Other (See Comments)     Causes pancreatitis    Barium Iodide     Contrast [Iodides] Hives    Diprivan [Propofol] Other (See Comments)     Causes pancreatitis    Toradol [Ketorolac Tromethamine] Other (See Comments)     Causes pancreatitis    Percocet [Oxycodone-Acetaminophen] Nausea Only       Problem List:    Patient Active Problem List   Diagnosis Code    Diabetes mellitus ([facility] [address] E11.9    Obesity E66.9    Anemia D64.9    Breast cancer, right breast ([facility] [address] C50.911    Acute on chronic pancreatitis (HCC) K85.90, K86.1    Chronic pancreatitis ([facility] [address] K86.1    Hypokalemia E87.6    Endometrial adenocarcinoma (HCC) C54.1    Sleep apnea G47.30    Pancreatitis K85.90    Pancreatic abnormality Q45.3    Hypertension I10    Hyperlipidemia E78.5    History of right breast cancer Z85.3    History of chemotherapy Z92.21    History of radiation therapy Z92.3    Patient is Zoroastrianism Z78.9    History of endometrial cancer Z85.42    Acute renal impairment N28.9    Benign neoplasm of colon D12.6    Carpal tunnel syndrome G56.00    History of colonic polyps Z86.010    Hypersomnia with sleep apnea G47.10, G47.30    Lesion of ulnar nerve G56.20    Osteoarthrosis involving lower leg M17.10    Other acne L70.8    Primary malignant neoplasm of female breast (HCC) C50.919    Rectal polyp K62.1    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 Isiah Mcgrath History of breast cancer in female 06/2003    Right w recurrence 2/2014    Hyperlipidemia     Hypertension     OA (osteoarthritis)     Obesity     Pancreatic abnormality     pancreatic stent placement - 2017    Pancreatitis     Sleep apnea     DOESN'T USE CPAP    Thyroid disease        Past Surgical History:        Procedure Laterality Date    APPENDECTOMY      BACK SURGERY      LIPOMA REMOVED    BREAST ENHANCEMENT SURGERY Bilateral     BREAST LUMPECTOMY Right 06/12/2003    dr. Ku Poplar Bilateral 10/2020 11/2020    CHOLECYSTECTOMY  1984    COLONOSCOPY  10/31/2014    FU 3 yr    DILATION AND CURETTAGE OF UTERUS  04/14/2017    ENDOSCOPIC ULTRASOUND (UPPER)  2/4/15    ERCP  6/5/2015    Sphincerotomy, Sphincter of Oddi Manometry, Pancreatic stent placement    ERCP  7/21/15    PANCREATIC STENT EXCHANGE    ERCP  09/01/15    stent removal    ERCP N/A 10/20/2015    Endoscopic retrograde pancreatography with pancreatic stent exchange    ERCP N/A 04/18/2017    ocedure: Endoscopic retrograde cholangiopancreatography with biopsy and pancreatic stent exchange    ERCP N/A 05/17/2017    EYE SURGERY      HYSTERECTOMY, TOTAL ABDOMINAL      KNEE ARTHROSCOPY      MASTECTOMY, BILATERAL Bilateral 02/04/2014    Dr. Tim Carranza  06/16/2017    Robotic hyst, BSO with Dr. Slade Blackmon  12/14/12    UPPER GASTROINTESTINAL ENDOSCOPY  2/4/15       Social History:    Social History     Tobacco Use    Smoking status: Never Smoker    Smokeless tobacco: Never Used   Substance Use Topics    Alcohol use:  No                                Counseling given: Not Answered      Vital Signs (Current):   Vitals:    12/01/20 1643 12/04/20 0723   BP:  137/61   Pulse:  63   Resp:  16   Temp:  97.5 °F (36.4 °C)   TempSrc:  Temporal   SpO2:  99%   Weight: 275 lb (124.7 kg) 275 lb (124.7 kg) Height: 5' 10\" (1.778 m) 5' 10\" (1.778 m)                                              BP Readings from Last 3 Encounters:   12/04/20 137/61   09/11/20 (!) 120/55   12/06/19 109/67       NPO Status:                                                                                 BMI:   Wt Readings from Last 3 Encounters:   12/04/20 275 lb (124.7 kg)   09/11/20 270 lb (122.5 kg)   12/06/19 265 lb (120.2 kg)     Body mass index is 39.46 kg/m². CBC:   Lab Results   Component Value Date    WBC 6.4 09/07/2020    RBC 3.95 09/07/2020    HGB 11.0 09/07/2020    HCT 35.8 09/07/2020    MCV 90.6 09/07/2020    RDW 15.9 09/07/2020     09/07/2020       CMP:   Lab Results   Component Value Date     12/01/2020    K 4.9 12/01/2020     12/01/2020    CO2 26 12/01/2020    BUN 19 12/01/2020    CREATININE 1.1 12/01/2020    GFRAA 59 12/01/2020    GFRAA >60 04/30/2013    AGRATIO 1.4 09/01/2020    LABGLOM 49 12/01/2020    GLUCOSE 106 12/01/2020    PROT 7.0 09/01/2020    PROT 7.4 10/30/2012    CALCIUM 9.4 12/01/2020    BILITOT 0.5 09/01/2020    ALKPHOS 90 09/01/2020    AST 10 09/01/2020    ALT 6 09/01/2020       POC Tests: No results for input(s): POCGLU, POCNA, POCK, POCCL, POCBUN, POCHEMO, POCHCT in the last 72 hours.     Coags: No results found for: PROTIME, INR, APTT    HCG (If Applicable): No results found for: PREGTESTUR, PREGSERUM, HCG, HCGQUANT     ABGs:   Lab Results   Component Value Date    PHART 7.389 02/19/2017    PO2ART 68.2 02/19/2017    QNB5KIS 45.1 02/19/2017    UNT1LCT 26.6 02/19/2017    BEART 1.4 02/19/2017    [id number] 94.5 02/19/2017        Type & Screen (If Applicable):  No results found for: LABABO, LABRH    Drug/Infectious Status (If Applicable):  No results found for: HIV, HEPCAB    COVID-19 Screening (If Applicable):   Lab Results   Component Value Date    COVID19 Not Detected 12/01/2020         Anesthesia Evaluation  Patient summary reviewed and Nursing notes reviewed history of anesthetic complications (propofol - ? pancreatitis in past):   Airway: Mallampati: I  TM distance: >3 FB   Neck ROM: full  Mouth opening: > = 3 FB Dental:          Pulmonary:   (+) sleep apnea: on CPAP,      (-) asthma and shortness of breath                           Cardiovascular:    (+) hypertension:, hyperlipidemia    (-)  angina                Neuro/Psych:      (-) CVA           GI/Hepatic/Renal:   (+) renal disease: CRI, morbid obesity     (-) GERD and liver disease       Endo/Other:    (+) DiabetesType II DM, , hypothyroidism: arthritis: OA., malignancy/cancer. Abdominal:           Vascular:     - PVD. Anesthesia Plan      MAC     ASA 3       Induction: intravenous. Anesthetic plan and risks discussed with patient. Plan discussed with CRNA.                   Analisa Wilder MD   12/4/2020

## 2021-02-24 ENCOUNTER — HOSPITAL ENCOUNTER (OUTPATIENT)
Dept: CT IMAGING | Age: 76
Discharge: HOME | End: 2021-02-24
Payer: MEDICARE

## 2021-02-24 DIAGNOSIS — R22.42 LOWER LEG MASS, LEFT: ICD-10-CM

## 2021-02-24 DIAGNOSIS — Z85.3 PERSONAL HISTORY OF MALIGNANT NEOPLASM OF BREAST: ICD-10-CM

## 2021-02-24 DIAGNOSIS — R19.00 ABDOMINAL MASS, UNSPECIFIED ABDOMINAL LOCATION: ICD-10-CM

## 2021-02-24 PROCEDURE — 74177 CT ABD & PELVIS W/CONTRAST: CPT

## 2021-02-24 PROCEDURE — 6360000004 HC RX CONTRAST MEDICATION: Performed by: OBSTETRICS & GYNECOLOGY

## 2021-02-24 RX ADMIN — IOHEXOL 50 ML: 240 INJECTION, SOLUTION INTRATHECAL; INTRAVASCULAR; INTRAVENOUS; ORAL at 08:31

## 2021-02-24 RX ADMIN — IOPAMIDOL 75 ML: 755 INJECTION, SOLUTION INTRAVENOUS at 08:31

## 2021-03-05 ENCOUNTER — HOSPITAL ENCOUNTER (OUTPATIENT)
Age: 76
Discharge: HOME | End: 2021-03-05
Payer: MEDICARE

## 2021-03-05 DIAGNOSIS — I12.9 HYPERTENSIVE KIDNEY DISEASE WITH CHRONIC KIDNEY DISEASE STAGE IV (HCC): ICD-10-CM

## 2021-03-05 DIAGNOSIS — N18.4 HYPERTENSIVE KIDNEY DISEASE WITH CHRONIC KIDNEY DISEASE STAGE IV (HCC): ICD-10-CM

## 2021-03-05 LAB
A/G RATIO: 1.1 (ref 1.1–2.2)
ALBUMIN SERPL-MCNC: 4.2 G/DL (ref 3.4–5)
ALP BLD-CCNC: 96 U/L (ref 40–129)
ALT SERPL-CCNC: <5 U/L (ref 10–40)
ANION GAP SERPL CALCULATED.3IONS-SCNC: 8 MMOL/L (ref 3–16)
AST SERPL-CCNC: 11 U/L (ref 15–37)
BILIRUB SERPL-MCNC: 0.6 MG/DL (ref 0–1)
BUN BLDV-MCNC: 15 MG/DL (ref 7–20)
CALCIUM SERPL-MCNC: 9.4 MG/DL (ref 8.3–10.6)
CHLORIDE BLD-SCNC: 107 MMOL/L (ref 99–110)
CO2: 27 MMOL/L (ref 21–32)
CREAT SERPL-MCNC: 1.1 MG/DL (ref 0.6–1.2)
CREATININE URINE: 193.5 MG/DL (ref 28–259)
GFR AFRICAN AMERICAN: 59
GFR NON-AFRICAN AMERICAN: 49
GLOBULIN: 3.9 G/DL
GLUCOSE BLD-MCNC: 93 MG/DL (ref 70–99)
HCT VFR BLD CALC: 33.4 % (ref 36–48)
HEMOGLOBIN: 10.9 G/DL (ref 12–16)
MCH RBC QN AUTO: 27.1 PG (ref 26–34)
MCHC RBC AUTO-ENTMCNC: 32.7 G/DL (ref 31–36)
MCV RBC AUTO: 83 FL (ref 80–100)
PDW BLD-RTO: 15.5 % (ref 12.4–15.4)
PLATELET # BLD: 352 K/UL (ref 135–450)
PMV BLD AUTO: 8 FL (ref 5–10.5)
POTASSIUM SERPL-SCNC: 4.3 MMOL/L (ref 3.5–5.1)
PROTEIN PROTEIN: 22 MG/DL
RBC # BLD: 4.02 M/UL (ref 4–5.2)
SODIUM BLD-SCNC: 142 MMOL/L (ref 136–145)
TOTAL PROTEIN: 8.1 G/DL (ref 6.4–8.2)
WBC # BLD: 7.9 K/UL (ref 4–11)

## 2021-03-05 PROCEDURE — 80053 COMPREHEN METABOLIC PANEL: CPT

## 2021-03-05 PROCEDURE — 82570 ASSAY OF URINE CREATININE: CPT

## 2021-03-05 PROCEDURE — 84156 ASSAY OF PROTEIN URINE: CPT

## 2021-03-05 PROCEDURE — 85027 COMPLETE CBC AUTOMATED: CPT

## 2021-05-05 ENCOUNTER — HOSPITAL ENCOUNTER (OUTPATIENT)
Dept: CT IMAGING | Age: 76
Discharge: HOME | End: 2021-05-05
Payer: MEDICARE

## 2021-05-05 DIAGNOSIS — Z85.42 ENCOUNTER FOR FOLLOW-UP SURVEILLANCE OF ENDOMETRIAL CANCER: ICD-10-CM

## 2021-05-05 DIAGNOSIS — Z85.3 PERSONAL HISTORY OF MALIGNANT NEOPLASM OF BREAST: ICD-10-CM

## 2021-05-05 DIAGNOSIS — Z08 ENCOUNTER FOR FOLLOW-UP SURVEILLANCE OF ENDOMETRIAL CANCER: ICD-10-CM

## 2021-05-05 DIAGNOSIS — R59.0 INGUINAL LYMPHADENOPATHY: ICD-10-CM

## 2021-05-05 LAB
BUN BLDV-MCNC: 33 MG/DL (ref 7–20)
CREAT SERPL-MCNC: 1.4 MG/DL (ref 0.6–1.2)
GFR AFRICAN AMERICAN: 45
GFR NON-AFRICAN AMERICAN: 37

## 2021-05-05 PROCEDURE — 72193 CT PELVIS W/DYE: CPT

## 2021-05-05 PROCEDURE — 82565 ASSAY OF CREATININE: CPT

## 2021-05-05 PROCEDURE — 84520 ASSAY OF UREA NITROGEN: CPT

## 2021-05-05 PROCEDURE — 6360000004 HC RX CONTRAST MEDICATION: Performed by: NURSE PRACTITIONER

## 2021-05-05 PROCEDURE — 36415 COLL VENOUS BLD VENIPUNCTURE: CPT

## 2021-05-05 RX ADMIN — IOHEXOL 50 ML: 240 INJECTION, SOLUTION INTRATHECAL; INTRAVASCULAR; INTRAVENOUS; ORAL at 12:03

## 2021-05-05 RX ADMIN — IOPAMIDOL 75 ML: 755 INJECTION, SOLUTION INTRAVENOUS at 12:04

## 2021-05-18 ENCOUNTER — HOSPITAL ENCOUNTER (OUTPATIENT)
Dept: INTERVENTIONAL RADIOLOGY/VASCULAR | Age: 76
Discharge: HOME | End: 2021-05-18
Payer: MEDICARE

## 2021-05-18 VITALS
BODY MASS INDEX: 38.65 KG/M2 | SYSTOLIC BLOOD PRESSURE: 122 MMHG | HEART RATE: 76 BPM | OXYGEN SATURATION: 99 % | WEIGHT: 270 LBS | RESPIRATION RATE: 16 BRPM | DIASTOLIC BLOOD PRESSURE: 58 MMHG | HEIGHT: 70 IN

## 2021-05-18 DIAGNOSIS — R59.0 INGUINAL LYMPHADENOPATHY: ICD-10-CM

## 2021-05-18 PROCEDURE — 88173 CYTOPATH EVAL FNA REPORT: CPT

## 2021-05-18 PROCEDURE — 38505 NEEDLE BIOPSY LYMPH NODES: CPT

## 2021-05-18 PROCEDURE — 88342 IMHCHEM/IMCYTCHM 1ST ANTB: CPT

## 2021-05-18 PROCEDURE — 76942 ECHO GUIDE FOR BIOPSY: CPT

## 2021-05-18 PROCEDURE — 88305 TISSUE EXAM BY PATHOLOGIST: CPT

## 2021-05-19 NOTE — PROGRESS NOTES
Spoke to patient post lymph node biopsy procedure. Patients site was clean, dry and intact. Patient denies any discomfort post procedure. I addressed all the patients concerns, and directed patient to contact Special Procedures if they had any further questions.

## 2021-09-17 ENCOUNTER — HOSPITAL ENCOUNTER (OUTPATIENT)
Age: 76
Discharge: HOME | End: 2021-09-17
Payer: MEDICARE

## 2021-09-17 DIAGNOSIS — N18.4 HYPERTENSIVE KIDNEY DISEASE WITH CHRONIC KIDNEY DISEASE STAGE IV (HCC): ICD-10-CM

## 2021-09-17 DIAGNOSIS — N28.1 RENAL CYST: ICD-10-CM

## 2021-09-17 DIAGNOSIS — I12.9 HYPERTENSIVE KIDNEY DISEASE WITH CHRONIC KIDNEY DISEASE STAGE IV (HCC): ICD-10-CM

## 2021-09-17 DIAGNOSIS — I95.0 IDIOPATHIC HYPOTENSION: ICD-10-CM

## 2021-09-17 DIAGNOSIS — E55.9 VITAMIN D DEFICIENCY: ICD-10-CM

## 2021-09-17 LAB
ALBUMIN SERPL-MCNC: 4 G/DL (ref 3.4–5)
ANION GAP SERPL CALCULATED.3IONS-SCNC: 14 MMOL/L (ref 3–16)
BUN BLDV-MCNC: 19 MG/DL (ref 7–20)
CALCIUM SERPL-MCNC: 9.2 MG/DL (ref 8.3–10.6)
CHLORIDE BLD-SCNC: 106 MMOL/L (ref 99–110)
CO2: 21 MMOL/L (ref 21–32)
CREAT SERPL-MCNC: 1.1 MG/DL (ref 0.6–1.2)
CREATININE URINE: 154.6 MG/DL (ref 28–259)
GFR AFRICAN AMERICAN: 59
GFR NON-AFRICAN AMERICAN: 49
GLUCOSE BLD-MCNC: 88 MG/DL (ref 70–99)
HCT VFR BLD CALC: 31.4 % (ref 36–48)
HEMOGLOBIN: 10.2 G/DL (ref 12–16)
MCH RBC QN AUTO: 27.2 PG (ref 26–34)
MCHC RBC AUTO-ENTMCNC: 32.7 G/DL (ref 31–36)
MCV RBC AUTO: 83.4 FL (ref 80–100)
PARATHYROID HORMONE INTACT: 103.8 PG/ML (ref 14–72)
PDW BLD-RTO: 14.5 % (ref 12.4–15.4)
PHOSPHORUS: 3 MG/DL (ref 2.5–4.9)
PLATELET # BLD: 366 K/UL (ref 135–450)
PMV BLD AUTO: 8.1 FL (ref 5–10.5)
POTASSIUM SERPL-SCNC: 4.6 MMOL/L (ref 3.5–5.1)
PROTEIN PROTEIN: 13 MG/DL
RBC # BLD: 3.76 M/UL (ref 4–5.2)
SODIUM BLD-SCNC: 141 MMOL/L (ref 136–145)
VITAMIN D 25-HYDROXY: 35.4 NG/ML
WBC # BLD: 7.6 K/UL (ref 4–11)

## 2021-09-17 PROCEDURE — 84156 ASSAY OF PROTEIN URINE: CPT

## 2021-09-17 PROCEDURE — 82306 VITAMIN D 25 HYDROXY: CPT

## 2021-09-17 PROCEDURE — 36415 COLL VENOUS BLD VENIPUNCTURE: CPT

## 2021-09-17 PROCEDURE — 80069 RENAL FUNCTION PANEL: CPT

## 2021-09-17 PROCEDURE — 83970 ASSAY OF PARATHORMONE: CPT

## 2021-09-17 PROCEDURE — 82570 ASSAY OF URINE CREATININE: CPT

## 2021-09-17 PROCEDURE — 85027 COMPLETE CBC AUTOMATED: CPT

## 2022-03-21 ENCOUNTER — HOSPITAL ENCOUNTER (OUTPATIENT)
Age: 77
Discharge: HOME | End: 2022-03-21
Payer: MEDICARE

## 2022-03-21 DIAGNOSIS — I12.9 HYPERTENSIVE KIDNEY DISEASE WITH CHRONIC KIDNEY DISEASE STAGE IV (HCC): ICD-10-CM

## 2022-03-21 DIAGNOSIS — I95.0 IDIOPATHIC HYPOTENSION: ICD-10-CM

## 2022-03-21 DIAGNOSIS — E78.5 DYSLIPIDEMIA: ICD-10-CM

## 2022-03-21 DIAGNOSIS — N28.1 RENAL CYST: ICD-10-CM

## 2022-03-21 DIAGNOSIS — N18.4 HYPERTENSIVE KIDNEY DISEASE WITH CHRONIC KIDNEY DISEASE STAGE IV (HCC): ICD-10-CM

## 2022-03-21 LAB
ALBUMIN SERPL-MCNC: 4 G/DL (ref 3.4–5)
ANION GAP SERPL CALCULATED.3IONS-SCNC: 11 MMOL/L (ref 3–16)
BUN BLDV-MCNC: 17 MG/DL (ref 7–20)
CALCIUM SERPL-MCNC: 9.2 MG/DL (ref 8.3–10.6)
CHLORIDE BLD-SCNC: 106 MMOL/L (ref 99–110)
CO2: 25 MMOL/L (ref 21–32)
CREAT SERPL-MCNC: 1 MG/DL (ref 0.6–1.2)
CREATININE URINE: 172.8 MG/DL (ref 28–259)
FERRITIN: 149.7 NG/ML (ref 15–150)
GFR AFRICAN AMERICAN: >60
GFR NON-AFRICAN AMERICAN: 54
GLUCOSE BLD-MCNC: 95 MG/DL (ref 70–99)
HCT VFR BLD CALC: 33.6 % (ref 36–48)
HEMOGLOBIN: 10.8 G/DL (ref 12–16)
IRON SATURATION: 30 % (ref 15–50)
IRON: 65 UG/DL (ref 37–145)
MCH RBC QN AUTO: 26.8 PG (ref 26–34)
MCHC RBC AUTO-ENTMCNC: 32 G/DL (ref 31–36)
MCV RBC AUTO: 83.8 FL (ref 80–100)
PDW BLD-RTO: 15.3 % (ref 12.4–15.4)
PHOSPHORUS: 3.1 MG/DL (ref 2.5–4.9)
PLATELET # BLD: 333 K/UL (ref 135–450)
PMV BLD AUTO: 7.6 FL (ref 5–10.5)
POTASSIUM SERPL-SCNC: 4.5 MMOL/L (ref 3.5–5.1)
PROTEIN PROTEIN: 13 MG/DL
RBC # BLD: 4.01 M/UL (ref 4–5.2)
SODIUM BLD-SCNC: 142 MMOL/L (ref 136–145)
TOTAL IRON BINDING CAPACITY: 214 UG/DL (ref 260–445)
WBC # BLD: 7.5 K/UL (ref 4–11)

## 2022-03-21 PROCEDURE — 83540 ASSAY OF IRON: CPT

## 2022-03-21 PROCEDURE — 82570 ASSAY OF URINE CREATININE: CPT

## 2022-03-21 PROCEDURE — 84156 ASSAY OF PROTEIN URINE: CPT

## 2022-03-21 PROCEDURE — 36415 COLL VENOUS BLD VENIPUNCTURE: CPT

## 2022-03-21 PROCEDURE — 82728 ASSAY OF FERRITIN: CPT

## 2022-03-21 PROCEDURE — 83550 IRON BINDING TEST: CPT

## 2022-03-21 PROCEDURE — 80069 RENAL FUNCTION PANEL: CPT

## 2022-03-21 PROCEDURE — 85027 COMPLETE CBC AUTOMATED: CPT

## 2022-07-18 ENCOUNTER — HOSPITAL ENCOUNTER (OUTPATIENT)
Age: 77
Discharge: HOME | End: 2022-07-18
Payer: MEDICARE

## 2022-07-18 DIAGNOSIS — I12.9 HYPERTENSIVE KIDNEY DISEASE: ICD-10-CM

## 2022-07-18 LAB
A/G RATIO: 1.1 (ref 1.1–2.2)
ALBUMIN SERPL-MCNC: 4 G/DL (ref 3.4–5)
ALP BLD-CCNC: 109 U/L (ref 40–129)
ALT SERPL-CCNC: <5 U/L (ref 10–40)
ANION GAP SERPL CALCULATED.3IONS-SCNC: 13 MMOL/L (ref 3–16)
AST SERPL-CCNC: 10 U/L (ref 15–37)
BILIRUB SERPL-MCNC: 0.6 MG/DL (ref 0–1)
BUN BLDV-MCNC: 23 MG/DL (ref 7–20)
CALCIUM SERPL-MCNC: 9 MG/DL (ref 8.3–10.6)
CHLORIDE BLD-SCNC: 106 MMOL/L (ref 99–110)
CO2: 24 MMOL/L (ref 21–32)
CREAT SERPL-MCNC: 1.3 MG/DL (ref 0.6–1.2)
CREATININE URINE: 96.1 MG/DL (ref 28–259)
GFR AFRICAN AMERICAN: 48
GFR NON-AFRICAN AMERICAN: 40
GLUCOSE BLD-MCNC: 93 MG/DL (ref 70–99)
HCT VFR BLD CALC: 31.5 % (ref 36–48)
HEMOGLOBIN: 10.4 G/DL (ref 12–16)
MCH RBC QN AUTO: 27.4 PG (ref 26–34)
MCHC RBC AUTO-ENTMCNC: 33 G/DL (ref 31–36)
MCV RBC AUTO: 83.1 FL (ref 80–100)
PDW BLD-RTO: 14.5 % (ref 12.4–15.4)
PLATELET # BLD: 389 K/UL (ref 135–450)
PMV BLD AUTO: 7.5 FL (ref 5–10.5)
POTASSIUM SERPL-SCNC: 4.2 MMOL/L (ref 3.5–5.1)
PROTEIN PROTEIN: 11 MG/DL
RBC # BLD: 3.78 M/UL (ref 4–5.2)
SODIUM BLD-SCNC: 143 MMOL/L (ref 136–145)
TOTAL PROTEIN: 7.7 G/DL (ref 6.4–8.2)
WBC # BLD: 8.5 K/UL (ref 4–11)

## 2022-07-18 PROCEDURE — 80053 COMPREHEN METABOLIC PANEL: CPT

## 2022-07-18 PROCEDURE — 85027 COMPLETE CBC AUTOMATED: CPT

## 2022-07-18 PROCEDURE — 36415 COLL VENOUS BLD VENIPUNCTURE: CPT

## 2022-07-18 PROCEDURE — 82570 ASSAY OF URINE CREATININE: CPT

## 2022-07-18 PROCEDURE — 84156 ASSAY OF PROTEIN URINE: CPT

## 2022-11-28 ENCOUNTER — HOSPITAL ENCOUNTER (OUTPATIENT)
Age: 77
Discharge: HOME | End: 2022-11-28
Payer: MEDICARE

## 2022-11-28 DIAGNOSIS — I12.9 HYPERTENSIVE KIDNEY DISEASE: ICD-10-CM

## 2022-11-28 LAB
A/G RATIO: 1.2 (ref 1.1–2.2)
ALBUMIN SERPL-MCNC: 3.9 G/DL (ref 3.4–5)
ALP BLD-CCNC: 101 U/L (ref 40–129)
ALT SERPL-CCNC: 6 U/L (ref 10–40)
ANION GAP SERPL CALCULATED.3IONS-SCNC: 12 MMOL/L (ref 3–16)
AST SERPL-CCNC: 11 U/L (ref 15–37)
BILIRUB SERPL-MCNC: 0.7 MG/DL (ref 0–1)
BUN BLDV-MCNC: 18 MG/DL (ref 7–20)
CALCIUM SERPL-MCNC: 9.4 MG/DL (ref 8.3–10.6)
CHLORIDE BLD-SCNC: 104 MMOL/L (ref 99–110)
CO2: 25 MMOL/L (ref 21–32)
CREAT SERPL-MCNC: 0.9 MG/DL (ref 0.6–1.2)
CREATININE URINE: 243 MG/DL (ref 28–259)
GFR SERPL CREATININE-BSD FRML MDRD: >60 ML/MIN/{1.73_M2}
GLUCOSE BLD-MCNC: 103 MG/DL (ref 70–99)
HCT VFR BLD CALC: 35.4 % (ref 36–48)
HEMOGLOBIN: 11.4 G/DL (ref 12–16)
MCH RBC QN AUTO: 27.3 PG (ref 26–34)
MCHC RBC AUTO-ENTMCNC: 32.1 G/DL (ref 31–36)
MCV RBC AUTO: 85 FL (ref 80–100)
PDW BLD-RTO: 15.4 % (ref 12.4–15.4)
PLATELET # BLD: 340 K/UL (ref 135–450)
PMV BLD AUTO: 8.3 FL (ref 5–10.5)
POTASSIUM SERPL-SCNC: 4.8 MMOL/L (ref 3.5–5.1)
PROTEIN PROTEIN: 40 MG/DL
PROTEIN/CREAT RATIO: 0.2 MG/DL
RBC # BLD: 4.17 M/UL (ref 4–5.2)
SODIUM BLD-SCNC: 141 MMOL/L (ref 136–145)
TOTAL PROTEIN: 7.2 G/DL (ref 6.4–8.2)
WBC # BLD: 7.8 K/UL (ref 4–11)

## 2022-11-28 PROCEDURE — 82570 ASSAY OF URINE CREATININE: CPT

## 2022-11-28 PROCEDURE — 36415 COLL VENOUS BLD VENIPUNCTURE: CPT

## 2022-11-28 PROCEDURE — 84156 ASSAY OF PROTEIN URINE: CPT

## 2022-11-28 PROCEDURE — 85027 COMPLETE CBC AUTOMATED: CPT

## 2022-11-28 PROCEDURE — 80053 COMPREHEN METABOLIC PANEL: CPT

## 2023-05-26 ENCOUNTER — HOSPITAL ENCOUNTER (OUTPATIENT)
Age: 78
Discharge: HOME | End: 2023-05-26
Payer: MEDICARE

## 2023-05-26 DIAGNOSIS — I12.9 HYPERTENSIVE KIDNEY DISEASE: ICD-10-CM

## 2023-05-26 LAB
ALBUMIN SERPL-MCNC: 4.1 G/DL (ref 3.4–5)
ALBUMIN/GLOB SERPL: 1.1 {RATIO} (ref 1.1–2.2)
ALP SERPL-CCNC: 96 U/L (ref 40–129)
ALT SERPL-CCNC: 6 U/L (ref 10–40)
ANION GAP SERPL CALCULATED.3IONS-SCNC: 10 MMOL/L (ref 3–16)
AST SERPL-CCNC: 10 U/L (ref 15–37)
BILIRUB SERPL-MCNC: 0.7 MG/DL (ref 0–1)
BUN SERPL-MCNC: 18 MG/DL (ref 7–20)
CALCIUM SERPL-MCNC: 9.4 MG/DL (ref 8.3–10.6)
CHLORIDE SERPL-SCNC: 108 MMOL/L (ref 99–110)
CO2 SERPL-SCNC: 25 MMOL/L (ref 21–32)
CREAT SERPL-MCNC: 1.1 MG/DL (ref 0.6–1.2)
CREAT UR-MCNC: 163.7 MG/DL (ref 28–259)
DEPRECATED RDW RBC AUTO: 14.8 % (ref 12.4–15.4)
GFR SERPLBLD CREATININE-BSD FMLA CKD-EPI: 52 ML/MIN/{1.73_M2}
GLUCOSE SERPL-MCNC: 95 MG/DL (ref 70–99)
HCT VFR BLD AUTO: 35.6 % (ref 36–48)
HGB BLD-MCNC: 11.7 G/DL (ref 12–16)
MCH RBC QN AUTO: 27.5 PG (ref 26–34)
MCHC RBC AUTO-ENTMCNC: 32.8 G/DL (ref 31–36)
MCV RBC AUTO: 83.9 FL (ref 80–100)
PLATELET # BLD AUTO: 358 K/UL (ref 135–450)
PMV BLD AUTO: 8.2 FL (ref 5–10.5)
POTASSIUM SERPL-SCNC: 4.2 MMOL/L (ref 3.5–5.1)
PROT SERPL-MCNC: 7.7 G/DL (ref 6.4–8.2)
PROT UR-MCNC: 14 MG/DL
PROT/CREAT UR-RTO: 0.1 MG/DL
RBC # BLD AUTO: 4.24 M/UL (ref 4–5.2)
SODIUM SERPL-SCNC: 143 MMOL/L (ref 136–145)
WBC # BLD AUTO: 8 K/UL (ref 4–11)

## 2023-05-26 PROCEDURE — 85027 COMPLETE CBC AUTOMATED: CPT

## 2023-05-26 PROCEDURE — 80053 COMPREHEN METABOLIC PANEL: CPT

## 2023-05-26 PROCEDURE — 84156 ASSAY OF PROTEIN URINE: CPT

## 2023-05-26 PROCEDURE — 36415 COLL VENOUS BLD VENIPUNCTURE: CPT

## 2023-05-26 PROCEDURE — 82570 ASSAY OF URINE CREATININE: CPT

## 2023-12-01 ENCOUNTER — HOSPITAL ENCOUNTER (OUTPATIENT)
Age: 78
Discharge: HOME | End: 2023-12-01
Payer: MEDICARE

## 2023-12-01 DIAGNOSIS — N18.31 CHRONIC KIDNEY DISEASE (CKD) STAGE G3A/A1, MODERATELY DECREASED GLOMERULAR FILTRATION RATE (GFR) BETWEEN 45-59 ML/MIN/1.73 SQUARE METER AND ALBUMINURIA CREATININE RATIO LESS THAN 30 MG/G (HCC): ICD-10-CM

## 2023-12-01 LAB
ALBUMIN SERPL-MCNC: 4 G/DL (ref 3.4–5)
ANION GAP SERPL CALCULATED.3IONS-SCNC: 9 MMOL/L (ref 3–16)
BUN SERPL-MCNC: 14 MG/DL (ref 7–20)
CALCIUM SERPL-MCNC: 9.2 MG/DL (ref 8.3–10.6)
CHLORIDE SERPL-SCNC: 104 MMOL/L (ref 99–110)
CO2 SERPL-SCNC: 27 MMOL/L (ref 21–32)
CREAT SERPL-MCNC: 1 MG/DL (ref 0.6–1.2)
CREAT UR-MCNC: 201.3 MG/DL (ref 28–259)
DEPRECATED RDW RBC AUTO: 14.9 % (ref 12.4–15.4)
GFR SERPLBLD CREATININE-BSD FMLA CKD-EPI: 58 ML/MIN/{1.73_M2}
GLUCOSE SERPL-MCNC: 106 MG/DL (ref 70–99)
HCT VFR BLD AUTO: 35 % (ref 36–48)
HGB BLD-MCNC: 11.5 G/DL (ref 12–16)
MCH RBC QN AUTO: 27.9 PG (ref 26–34)
MCHC RBC AUTO-ENTMCNC: 32.7 G/DL (ref 31–36)
MCV RBC AUTO: 85.3 FL (ref 80–100)
PHOSPHATE SERPL-MCNC: 2.7 MG/DL (ref 2.5–4.9)
PLATELET # BLD AUTO: 406 K/UL (ref 135–450)
PMV BLD AUTO: 7.9 FL (ref 5–10.5)
POTASSIUM SERPL-SCNC: 4 MMOL/L (ref 3.5–5.1)
PROT UR-MCNC: 18 MG/DL
PROT/CREAT UR-RTO: 0.1 MG/DL
RBC # BLD AUTO: 4.11 M/UL (ref 4–5.2)
SODIUM SERPL-SCNC: 140 MMOL/L (ref 136–145)
WBC # BLD AUTO: 9.8 K/UL (ref 4–11)

## 2023-12-01 PROCEDURE — 80069 RENAL FUNCTION PANEL: CPT

## 2023-12-01 PROCEDURE — 82570 ASSAY OF URINE CREATININE: CPT

## 2023-12-01 PROCEDURE — 36415 COLL VENOUS BLD VENIPUNCTURE: CPT

## 2023-12-01 PROCEDURE — 85027 COMPLETE CBC AUTOMATED: CPT

## 2023-12-01 PROCEDURE — 84156 ASSAY OF PROTEIN URINE: CPT

## 2024-05-31 ENCOUNTER — HOSPITAL ENCOUNTER (OUTPATIENT)
Age: 79
Discharge: HOME | End: 2024-05-31
Payer: MEDICARE

## 2024-05-31 DIAGNOSIS — N18.31 CHRONIC KIDNEY DISEASE (CKD) STAGE G3A/A1, MODERATELY DECREASED GLOMERULAR FILTRATION RATE (GFR) BETWEEN 45-59 ML/MIN/1.73 SQUARE METER AND ALBUMINURIA CREATININE RATIO LESS THAN 30 MG/G (HCC): ICD-10-CM

## 2024-05-31 LAB
ALBUMIN SERPL-MCNC: 3.9 G/DL (ref 3.4–5)
ANION GAP SERPL CALCULATED.3IONS-SCNC: 8 MMOL/L (ref 3–16)
BUN SERPL-MCNC: 19 MG/DL (ref 7–20)
CALCIUM SERPL-MCNC: 9.3 MG/DL (ref 8.3–10.6)
CHLORIDE SERPL-SCNC: 106 MMOL/L (ref 99–110)
CO2 SERPL-SCNC: 26 MMOL/L (ref 21–32)
CREAT SERPL-MCNC: 1 MG/DL (ref 0.6–1.2)
CREAT UR-MCNC: 147.1 MG/DL (ref 28–259)
DEPRECATED RDW RBC AUTO: 14.6 % (ref 12.4–15.4)
GFR SERPLBLD CREATININE-BSD FMLA CKD-EPI: 58 ML/MIN/{1.73_M2}
GLUCOSE SERPL-MCNC: 100 MG/DL (ref 70–99)
HCT VFR BLD AUTO: 33.1 % (ref 36–48)
HGB BLD-MCNC: 11 G/DL (ref 12–16)
MCH RBC QN AUTO: 28.1 PG (ref 26–34)
MCHC RBC AUTO-ENTMCNC: 33.3 G/DL (ref 31–36)
MCV RBC AUTO: 84.4 FL (ref 80–100)
PHOSPHATE SERPL-MCNC: 3.1 MG/DL (ref 2.5–4.9)
PLATELET # BLD AUTO: 325 K/UL (ref 135–450)
PMV BLD AUTO: 8 FL (ref 5–10.5)
POTASSIUM SERPL-SCNC: 4.4 MMOL/L (ref 3.5–5.1)
PROT UR-MCNC: 13 MG/DL
PROT/CREAT UR-RTO: 0.1 MG/DL
RBC # BLD AUTO: 3.92 M/UL (ref 4–5.2)
SODIUM SERPL-SCNC: 140 MMOL/L (ref 136–145)
WBC # BLD AUTO: 8.2 K/UL (ref 4–11)

## 2024-05-31 PROCEDURE — 85027 COMPLETE CBC AUTOMATED: CPT

## 2024-05-31 PROCEDURE — 80069 RENAL FUNCTION PANEL: CPT

## 2024-05-31 PROCEDURE — 82570 ASSAY OF URINE CREATININE: CPT

## 2024-05-31 PROCEDURE — 36415 COLL VENOUS BLD VENIPUNCTURE: CPT

## 2024-05-31 PROCEDURE — 84156 ASSAY OF PROTEIN URINE: CPT

## 2024-12-02 ENCOUNTER — HOSPITAL ENCOUNTER (OUTPATIENT)
Age: 79
Discharge: HOME | End: 2024-12-02
Payer: COMMERCIAL

## 2024-12-02 DIAGNOSIS — N18.31 CHRONIC KIDNEY DISEASE (CKD) STAGE G3A/A1, MODERATELY DECREASED GLOMERULAR FILTRATION RATE (GFR) BETWEEN 45-59 ML/MIN/1.73 SQUARE METER AND ALBUMINURIA CREATININE RATIO LESS THAN 30 MG/G (HCC): ICD-10-CM

## 2024-12-02 LAB
ALBUMIN SERPL-MCNC: 3.9 G/DL (ref 3.4–5)
ANION GAP SERPL CALCULATED.3IONS-SCNC: 10 MMOL/L (ref 3–16)
BACTERIA URNS QL MICRO: ABNORMAL /HPF
BILIRUB UR QL STRIP.AUTO: NEGATIVE
BUN SERPL-MCNC: 18 MG/DL (ref 7–20)
CALCIUM SERPL-MCNC: 9.4 MG/DL (ref 8.3–10.6)
CHLORIDE SERPL-SCNC: 106 MMOL/L (ref 99–110)
CLARITY UR: CLEAR
CO2 SERPL-SCNC: 25 MMOL/L (ref 21–32)
COLOR UR: YELLOW
CREAT SERPL-MCNC: 1 MG/DL (ref 0.6–1.2)
CREAT UR-MCNC: 210 MG/DL (ref 28–259)
DEPRECATED RDW RBC AUTO: 15.3 % (ref 12.4–15.4)
EPI CELLS #/AREA URNS HPF: ABNORMAL /HPF (ref 0–5)
GFR SERPLBLD CREATININE-BSD FMLA CKD-EPI: 58 ML/MIN/{1.73_M2}
GLUCOSE SERPL-MCNC: 90 MG/DL (ref 70–99)
GLUCOSE UR STRIP.AUTO-MCNC: NEGATIVE MG/DL
HCT VFR BLD AUTO: 35.7 % (ref 36–48)
HGB BLD-MCNC: 11.6 G/DL (ref 12–16)
HGB UR QL STRIP.AUTO: ABNORMAL
KETONES UR STRIP.AUTO-MCNC: NEGATIVE MG/DL
LEUKOCYTE ESTERASE UR QL STRIP.AUTO: NEGATIVE
MCH RBC QN AUTO: 28.1 PG (ref 26–34)
MCHC RBC AUTO-ENTMCNC: 32.5 G/DL (ref 31–36)
MCV RBC AUTO: 86.4 FL (ref 80–100)
NITRITE UR QL STRIP.AUTO: NEGATIVE
PH UR STRIP.AUTO: 5.5 [PH] (ref 5–8)
PHOSPHATE SERPL-MCNC: 2.6 MG/DL (ref 2.5–4.9)
PLATELET # BLD AUTO: 346 K/UL (ref 135–450)
PMV BLD AUTO: 8.2 FL (ref 5–10.5)
POTASSIUM SERPL-SCNC: 4.1 MMOL/L (ref 3.5–5.1)
PROT UR STRIP.AUTO-MCNC: NEGATIVE MG/DL
PROT UR-MCNC: 21 MG/DL
PROT/CREAT UR-RTO: 0.1 MG/DL
RBC # BLD AUTO: 4.13 M/UL (ref 4–5.2)
RBC #/AREA URNS HPF: ABNORMAL /HPF (ref 0–4)
SODIUM SERPL-SCNC: 141 MMOL/L (ref 136–145)
SP GR UR STRIP.AUTO: 1.02 (ref 1–1.03)
UA DIPSTICK W REFLEX MICRO PNL UR: YES
URN SPEC COLLECT METH UR: ABNORMAL
UROBILINOGEN UR STRIP-ACNC: 0.2 E.U./DL
WBC # BLD AUTO: 7.9 K/UL (ref 4–11)
WBC #/AREA URNS HPF: ABNORMAL /HPF (ref 0–5)

## 2024-12-02 PROCEDURE — 81001 URINALYSIS AUTO W/SCOPE: CPT

## 2024-12-02 PROCEDURE — 80069 RENAL FUNCTION PANEL: CPT

## 2024-12-02 PROCEDURE — 85027 COMPLETE CBC AUTOMATED: CPT

## 2024-12-02 PROCEDURE — 36415 COLL VENOUS BLD VENIPUNCTURE: CPT

## 2024-12-02 PROCEDURE — 82570 ASSAY OF URINE CREATININE: CPT

## 2024-12-02 PROCEDURE — 84156 ASSAY OF PROTEIN URINE: CPT

## 2025-06-06 ENCOUNTER — HOSPITAL ENCOUNTER (OUTPATIENT)
Age: 80
Setting detail: SPECIMEN
Discharge: HOME | End: 2025-06-06
Payer: COMMERCIAL

## 2025-06-06 ENCOUNTER — HOSPITAL ENCOUNTER (OUTPATIENT)
Dept: ULTRASOUND IMAGING | Age: 80
Discharge: HOME | End: 2025-06-06
Payer: COMMERCIAL

## 2025-06-06 DIAGNOSIS — N28.1 RENAL CYST: ICD-10-CM

## 2025-06-06 DIAGNOSIS — N18.31 CHRONIC KIDNEY DISEASE (CKD) STAGE G3A/A1, MODERATELY DECREASED GLOMERULAR FILTRATION RATE (GFR) BETWEEN 45-59 ML/MIN/1.73 SQUARE METER AND ALBUMINURIA CREATININE RATIO LESS THAN 30 MG/G (HCC): ICD-10-CM

## 2025-06-06 DIAGNOSIS — R60.9 EDEMA, PITTING: ICD-10-CM

## 2025-06-06 DIAGNOSIS — I12.9 HYPERTENSIVE KIDNEY DISEASE: ICD-10-CM

## 2025-06-06 LAB
ALBUMIN SERPL-MCNC: 3.9 G/DL (ref 3.4–5)
ANION GAP SERPL CALCULATED.3IONS-SCNC: 9 MMOL/L (ref 3–16)
BACTERIA URNS QL MICRO: ABNORMAL /HPF
BILIRUB UR QL STRIP.AUTO: NEGATIVE
BUN SERPL-MCNC: 20 MG/DL (ref 7–20)
CALCIUM SERPL-MCNC: 9.3 MG/DL (ref 8.3–10.6)
CHLORIDE SERPL-SCNC: 104 MMOL/L (ref 99–110)
CLARITY UR: CLEAR
CO2 SERPL-SCNC: 26 MMOL/L (ref 21–32)
COLOR UR: YELLOW
CREAT SERPL-MCNC: 1 MG/DL (ref 0.6–1.2)
CREAT UR-MCNC: 52.7 MG/DL (ref 28–259)
DEPRECATED RDW RBC AUTO: 14.3 % (ref 12.4–15.4)
EPI CELLS #/AREA URNS AUTO: 1 /HPF (ref 0–5)
GFR SERPLBLD CREATININE-BSD FMLA CKD-EPI: 58 ML/MIN/{1.73_M2}
GLUCOSE SERPL-MCNC: 109 MG/DL (ref 70–99)
GLUCOSE UR STRIP.AUTO-MCNC: NEGATIVE MG/DL
HCT VFR BLD AUTO: 34 % (ref 36–48)
HGB BLD-MCNC: 11.4 G/DL (ref 12–16)
HGB UR QL STRIP.AUTO: NEGATIVE
HYALINE CASTS #/AREA URNS AUTO: 0 /LPF (ref 0–8)
KETONES UR STRIP.AUTO-MCNC: NEGATIVE MG/DL
LEUKOCYTE ESTERASE UR QL STRIP.AUTO: NEGATIVE
MCH RBC QN AUTO: 28.2 PG (ref 26–34)
MCHC RBC AUTO-ENTMCNC: 33.6 G/DL (ref 31–36)
MCV RBC AUTO: 83.9 FL (ref 80–100)
NITRITE UR QL STRIP.AUTO: NEGATIVE
PH UR STRIP.AUTO: 5.5 [PH] (ref 5–8)
PHOSPHATE SERPL-MCNC: 2.8 MG/DL (ref 2.5–4.9)
PLATELET # BLD AUTO: 317 K/UL (ref 135–450)
PMV BLD AUTO: 7.9 FL (ref 5–10.5)
POTASSIUM SERPL-SCNC: 4.3 MMOL/L (ref 3.5–5.1)
PROT UR STRIP.AUTO-MCNC: NEGATIVE MG/DL
PROT UR-MCNC: 6 MG/DL
PROT/CREAT UR-RTO: 0.1 MG/DL
RBC # BLD AUTO: 4.06 M/UL (ref 4–5.2)
RBC CLUMPS #/AREA URNS AUTO: 1 /HPF (ref 0–4)
SODIUM SERPL-SCNC: 139 MMOL/L (ref 136–145)
SP GR UR STRIP.AUTO: 1.01 (ref 1–1.03)
UA DIPSTICK W REFLEX MICRO PNL UR: ABNORMAL
URN SPEC COLLECT METH UR: ABNORMAL
UROBILINOGEN UR STRIP-ACNC: 0.2 E.U./DL
WBC # BLD AUTO: 6.8 K/UL (ref 4–11)
WBC #/AREA URNS AUTO: 1 /HPF (ref 0–5)

## 2025-06-06 PROCEDURE — 84156 ASSAY OF PROTEIN URINE: CPT

## 2025-06-06 PROCEDURE — 81001 URINALYSIS AUTO W/SCOPE: CPT

## 2025-06-06 PROCEDURE — 36415 COLL VENOUS BLD VENIPUNCTURE: CPT

## 2025-06-06 PROCEDURE — 82570 ASSAY OF URINE CREATININE: CPT

## 2025-06-06 PROCEDURE — 76770 US EXAM ABDO BACK WALL COMP: CPT

## 2025-06-06 PROCEDURE — 85027 COMPLETE CBC AUTOMATED: CPT

## 2025-06-06 PROCEDURE — 80069 RENAL FUNCTION PANEL: CPT

## (undated) DEVICE — TRAP SPEC RETRV CLR PLAS POLYP IN LN SUCT QUIK CTCH

## (undated) DEVICE — BW-412T DISP COMBO CLEANING BRUSH: Brand: SINGLE USE COMBINATION CLEANING BRUSH

## (undated) DEVICE — SET VLV 3 PC AWS DISPOSABLE GRDIAN SCOPEVALET

## (undated) DEVICE — SNARE ENDOSCP L240CM SHTH DIA24MM LOOP W10MM POLYP RND REINF

## (undated) DEVICE — PROCEDURE KIT ENDOSCP CUST

## (undated) DEVICE — SOLUTION IV IRRIG WATER 500ML POUR BRL ST 2F7113